# Patient Record
Sex: MALE | Race: WHITE | Employment: FULL TIME | RURAL
[De-identification: names, ages, dates, MRNs, and addresses within clinical notes are randomized per-mention and may not be internally consistent; named-entity substitution may affect disease eponyms.]

---

## 2021-01-16 ENCOUNTER — APPOINTMENT (OUTPATIENT)
Dept: CT IMAGING | Facility: HOSPITAL | Age: 31
End: 2021-01-16

## 2021-01-16 ENCOUNTER — HOSPITAL ENCOUNTER (EMERGENCY)
Facility: HOSPITAL | Age: 31
Discharge: HOME | End: 2021-01-16
Attending: EMERGENCY MEDICINE

## 2021-01-16 ENCOUNTER — APPOINTMENT (OUTPATIENT)
Dept: GENERAL RADIOLOGY | Facility: HOSPITAL | Age: 31
End: 2021-01-16

## 2021-01-16 VITALS
OXYGEN SATURATION: 98 % | SYSTOLIC BLOOD PRESSURE: 136 MMHG | WEIGHT: 145 LBS | HEART RATE: 68 BPM | BODY MASS INDEX: 21.98 KG/M2 | HEIGHT: 68 IN | TEMPERATURE: 98.5 F | RESPIRATION RATE: 18 BRPM | DIASTOLIC BLOOD PRESSURE: 79 MMHG
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
## 2021-01-16 DIAGNOSIS — F41.9 ANXIETY TENSION STATE: ICD-10-CM

## 2021-01-16 DIAGNOSIS — R10.9 ABDOMINAL PAIN, UNSPECIFIED ABDOMINAL LOCATION: ICD-10-CM

## 2021-01-16 DIAGNOSIS — M54.41 ACUTE BILATERAL LOW BACK PAIN WITH RIGHT-SIDED SCIATICA: ICD-10-CM

## 2021-01-16 LAB
A/G RATIO: 1.5 (ref 0.8–2)
ALBUMIN SERPL-MCNC: 4.4 G/DL (ref 3.4–4.8)
ALP BLD-CCNC: 77 U/L (ref 25–100)
ALT SERPL-CCNC: 21 U/L (ref 4–36)
AMPHETAMINE SCREEN, URINE: ABNORMAL
ANION GAP SERPL CALCULATED.3IONS-SCNC: 14 MMOL/L (ref 3–16)
AST SERPL-CCNC: 31 U/L (ref 8–33)
BARBITURATE SCREEN URINE: ABNORMAL
BASOPHILS ABSOLUTE: 0 K/UL (ref 0–0.1)
BASOPHILS RELATIVE PERCENT: 0.4 %
BENZODIAZEPINE SCREEN, URINE: ABNORMAL
BILIRUB SERPL-MCNC: 0.4 MG/DL (ref 0.3–1.2)
BILIRUBIN URINE: NEGATIVE
BLOOD, URINE: NEGATIVE
BUN BLDV-MCNC: 8 MG/DL (ref 6–20)
CALCIUM SERPL-MCNC: 9.7 MG/DL (ref 8.5–10.5)
CANNABINOID SCREEN URINE: POSITIVE
CHLORIDE BLD-SCNC: 97 MMOL/L (ref 98–107)
CLARITY: CLEAR
CO2: 23 MMOL/L (ref 20–30)
COCAINE METABOLITE SCREEN URINE: ABNORMAL
COLOR: YELLOW
CREAT SERPL-MCNC: 1 MG/DL (ref 0.4–1.2)
EOSINOPHILS ABSOLUTE: 0.1 K/UL (ref 0–0.4)
EOSINOPHILS RELATIVE PERCENT: 1.1 %
ETHANOL: NORMAL MG/DL (ref 0–0.08)
GFR AFRICAN AMERICAN: >59
GFR NON-AFRICAN AMERICAN: >59
GLOBULIN: 3 G/DL
GLUCOSE BLD-MCNC: 149 MG/DL (ref 74–106)
GLUCOSE URINE: NEGATIVE MG/DL
HCT VFR BLD CALC: 39.4 % (ref 40–54)
HEMOGLOBIN: 13.7 G/DL (ref 13–18)
IMMATURE GRANULOCYTES #: 0 K/UL
IMMATURE GRANULOCYTES %: 0.2 % (ref 0–5)
KETONES, URINE: NEGATIVE MG/DL
LACTIC ACID: 1.8 MMOL/L (ref 0.4–2)
LEUKOCYTE ESTERASE, URINE: NEGATIVE
LYMPHOCYTES ABSOLUTE: 2.6 K/UL (ref 1.5–4)
LYMPHOCYTES RELATIVE PERCENT: 26.7 %
Lab: ABNORMAL
MCH RBC QN AUTO: 33.5 PG (ref 27–32)
MCHC RBC AUTO-ENTMCNC: 34.8 G/DL (ref 31–35)
MCV RBC AUTO: 96.3 FL (ref 80–100)
METHADONE SCREEN, URINE: ABNORMAL
METHAMPHETAMINE, URINE: ABNORMAL
MICROSCOPIC EXAMINATION: NORMAL
MONOCYTES ABSOLUTE: 0.9 K/UL (ref 0.2–0.8)
MONOCYTES RELATIVE PERCENT: 9.6 %
NEUTROPHILS ABSOLUTE: 5.9 K/UL (ref 2–7.5)
NEUTROPHILS RELATIVE PERCENT: 62 %
NITRITE, URINE: NEGATIVE
OPIATE SCREEN URINE: ABNORMAL
PDW BLD-RTO: 12 % (ref 11–16)
PH UA: 6 (ref 5–8)
PHENCYCLIDINE SCREEN URINE: ABNORMAL
PLATELET # BLD: 223 K/UL (ref 150–400)
PMV BLD AUTO: 9.5 FL (ref 6–10)
POTASSIUM SERPL-SCNC: 3.4 MMOL/L (ref 3.4–5.1)
PRO-BNP: 26 PG/ML (ref 0–900)
PROPOXYPHENE SCREEN, URINE: ABNORMAL
PROTEIN UA: NEGATIVE MG/DL
RBC # BLD: 4.09 M/UL (ref 4.5–6)
SODIUM BLD-SCNC: 134 MMOL/L (ref 136–145)
SPECIFIC GRAVITY UA: 1.01 (ref 1–1.03)
TOTAL PROTEIN: 7.4 G/DL (ref 6.4–8.3)
TRICYCLIC, URINE: ABNORMAL
TROPONIN: <0.3 NG/ML
UR OXYCODONE RAPID SCREEN: ABNORMAL
URINE REFLEX TO CULTURE: NORMAL
URINE TYPE: NORMAL
UROBILINOGEN, URINE: 0.2 E.U./DL
WBC # BLD: 9.6 K/UL (ref 4–11)

## 2021-01-16 PROCEDURE — 80307 DRUG TEST PRSMV CHEM ANLYZR: CPT

## 2021-01-16 PROCEDURE — 36415 COLL VENOUS BLD VENIPUNCTURE: CPT

## 2021-01-16 PROCEDURE — 71045 X-RAY EXAM CHEST 1 VIEW: CPT

## 2021-01-16 PROCEDURE — 93005 ELECTROCARDIOGRAM TRACING: CPT

## 2021-01-16 PROCEDURE — 70450 CT HEAD/BRAIN W/O DYE: CPT

## 2021-01-16 PROCEDURE — 83605 ASSAY OF LACTIC ACID: CPT

## 2021-01-16 PROCEDURE — 81003 URINALYSIS AUTO W/O SCOPE: CPT

## 2021-01-16 PROCEDURE — 72131 CT LUMBAR SPINE W/O DYE: CPT

## 2021-01-16 PROCEDURE — 6360000004 HC RX CONTRAST MEDICATION: Performed by: EMERGENCY MEDICINE

## 2021-01-16 PROCEDURE — G0480 DRUG TEST DEF 1-7 CLASSES: HCPCS

## 2021-01-16 PROCEDURE — 85025 COMPLETE CBC W/AUTO DIFF WBC: CPT

## 2021-01-16 PROCEDURE — 2580000003 HC RX 258: Performed by: EMERGENCY MEDICINE

## 2021-01-16 PROCEDURE — 99283 EMERGENCY DEPT VISIT LOW MDM: CPT

## 2021-01-16 PROCEDURE — 80053 COMPREHEN METABOLIC PANEL: CPT

## 2021-01-16 PROCEDURE — 83880 ASSAY OF NATRIURETIC PEPTIDE: CPT

## 2021-01-16 PROCEDURE — 84484 ASSAY OF TROPONIN QUANT: CPT

## 2021-01-16 PROCEDURE — 74177 CT ABD & PELVIS W/CONTRAST: CPT

## 2021-01-16 PROCEDURE — 87040 BLOOD CULTURE FOR BACTERIA: CPT

## 2021-01-16 RX ORDER — 0.9 % SODIUM CHLORIDE 0.9 %
1000 INTRAVENOUS SOLUTION INTRAVENOUS ONCE
Status: COMPLETED | OUTPATIENT
Start: 2021-01-16 | End: 2021-01-16

## 2021-01-16 RX ADMIN — SODIUM CHLORIDE 1000 ML: 9 INJECTION, SOLUTION INTRAVENOUS at 06:54

## 2021-01-16 RX ADMIN — IOPAMIDOL 100 ML: 755 INJECTION, SOLUTION INTRAVENOUS at 07:14

## 2021-01-16 ASSESSMENT — PAIN DESCRIPTION - LOCATION: LOCATION: BACK;LEG;HEAD

## 2021-01-16 ASSESSMENT — PAIN DESCRIPTION - DESCRIPTORS: DESCRIPTORS: SHOOTING

## 2021-01-16 ASSESSMENT — PAIN DESCRIPTION - ORIENTATION: ORIENTATION: LOWER;RIGHT;LEFT

## 2021-01-16 ASSESSMENT — PAIN DESCRIPTION - PROGRESSION: CLINICAL_PROGRESSION: GRADUALLY IMPROVING

## 2021-01-16 NOTE — ED NOTES
Administered Isovue 370 per radiology protocol via patent hep lock established in ER. Pt tolerated the exam with no apparent difficulties and was d/c'd back to ER with no apparent reaction to contrast.  ER RN notified of contrast administration.

## 2021-01-16 NOTE — ED PROVIDER NOTES
Emergency Department Encounter  Location: [address] [place] [address] [address]    Patient: Km Sanon  MRN: [id number]  : 1990  Date of evaluation: 2021  ED Provider: Noelle Weaver DO    0700  Km Sanon was checked out to me by Dr. Mami Salgado. Please see his/her initial documentation for details of the patient's initial ED presentation, physical exam and completed studies. In brief, Km Sanon is a 27 y.o. male that presented to the emergency department for fall 3 days ago with injury to back. Pain radiates down right leg. No loss of bowel or bladder control. Patient states he feels like he is having spasms in his pelvic area. No head injury or LOC but states that he has had a headache since the fall. Patient thinks that he may be having seizures but no LOC or loss of time. C/o of abdominal \"twitching\". Patient had some discomfort this morning that started shooting all the way up to his \"brain and then down to his toes\". Patient admits he has been drinking alcohol and uses marijuana. Symptoms present for the past 3 days.     I have reviewed and interpreted all of the currently available lab results and diagnostics from this visit:  Results for orders placed or performed during the hospital encounter of 21   Brain Natriuretic Peptide   Result Value Ref Range    Pro-BNP 26 0 - 900 pg/mL   CBC Auto Differential   Result Value Ref Range    WBC 9.6 4.0 - 11.0 K/uL    RBC 4.09 (L) 4.50 - 6.00 M/uL    Hemoglobin 13.7 13.0 - 18.0 g/dL    Hematocrit 39.4 (L) 40.0 - 54.0 %    MCV 96.3 80.0 - 100.0 fL    MCH 33.5 (H) 27.0 - 32.0 pg    MCHC 34.8 31.0 - 35.0 g/dL    RDW 12.0 11.0 - 16.0 %    Platelets 744 435 - 254 K/uL    MPV 9.5 6.0 - 10.0 fL    Neutrophils % 62.0 %    Immature Granulocytes % 0.2 0.0 - 5.0 %    Lymphocytes % 26.7 %    Monocytes % 9.6 %    Eosinophils % 1.1 %    Basophils % 0.4 %    Neutrophils Absolute 5.9 2.0 - 7.5 K/uL    Immature Granulocytes # 0.0 K/uL Lymphocytes Absolute 2.6 1.5 - 4.0 K/uL    Monocytes Absolute 0.9 (H) 0.2 - 0.8 K/uL    Eosinophils Absolute 0.1 0.0 - 0.4 K/uL    Basophils Absolute 0.0 0.0 - 0.1 K/uL   Comprehensive Metabolic Panel   Result Value Ref Range    Sodium 134 (L) 136 - 145 mmol/L    Potassium 3.4 3.4 - 5.1 mmol/L    Chloride 97 (L) 98 - 107 mmol/L    CO2 23 20 - 30 mmol/L    Anion Gap 14 3 - 16    Glucose 149 (H) 74 - 106 mg/dL    BUN 8 6 - 20 mg/dL    CREATININE 1.0 0.4 - 1.2 mg/dL    GFR Non-African American >59 >59    GFR African American >59 >59    Calcium 9.7 8.5 - 10.5 mg/dL    Total Protein 7.4 6.4 - 8.3 g/dL    Alb 4.4 3.4 - 4.8 g/dL    Albumin/Globulin Ratio 1.5 0.8 - 2.0    Total Bilirubin 0.4 0.3 - 1.2 mg/dL    Alkaline Phosphatase 77 25 - 100 U/L    ALT 21 4 - 36 U/L    AST 31 8 - 33 U/L    Globulin 3.0 g/dL   Drug Screen, Multiple, Urine   Result Value Ref Range    PCP Screen, Urine Neg Negative <25 ng/mL    Benzodiazepine Screen, Urine Neg Negative <300 ng/mL    Cocaine Metabolite Screen, Urine Neg Negative <300 ng/mL    Amphetamine Screen, Urine Neg Negative <1000 ng/mL    Cannabinoid Scrn, Ur POSITIVE (A) Negative <50 ng/mL    Opiate Scrn, Ur Neg Negative <300 ng/mL    Barbiturate Screen, Ur Neg Negative <980 ng/mL    Tricyclic Neg Negative <657 ng/mL    Methadone Screen, Urine Neg Negative <300 ng/ml    Propoxyphene Screen, Urine Neg Negative <300 ng/mL    Methamphetamine, Urine Neg Negative <1000 ng/mL    UR Oxycodone Rapid Screen Neg Negative <100 ng/mL    Drug Screen Comment: see below    Lactic Acid, Plasma   Result Value Ref Range    Lactic Acid 1.8 0.4 - 2.0 mmol/L   Troponin   Result Value Ref Range    Troponin <0.30 <0.30 ng/mL   Urinalysis Reflex to Culture    Specimen: Urine, clean catch   Result Value Ref Range    Color, UA Yellow Straw/Yellow    Clarity, UA Clear Clear    Glucose, Ur Negative Negative mg/dL    Bilirubin Urine Negative Negative    Ketones, Urine Negative Negative mg/dL    Specific Gravity, UA 1.010 1.005 - 1.030    Blood, Urine Negative Negative    pH, UA 6.0 5.0 - 8.0    Protein, UA Negative Negative mg/dL    Urobilinogen, Urine 0.2 <2.0 E.U./dL    Nitrite, Urine Negative Negative    Leukocyte Esterase, Urine Negative Negative    Microscopic Examination Not Indicated     Urine Type Voided     Urine Reflex to Culture Not Indicated    Ethanol   Result Value Ref Range    Ethanol Lvl None Detected mg/dL     Ct Head Wo Contrast    Result Date: 1/16/2021  PROCEDURE: CT head without contrast INDICATION: Fall with questionable seizure activity; COMPARISON: None. TECHNIQUE: Axial CT imaging obtained from vertex to skull base. Axial images and multiplanar reformatted images reviewed. Up-to-date CT equipment and radiation dose reduction techniques were employed. IV Contrast: None. FINDINGS: No acute intra- or extra-axial fluid collections are identified. The ventricles are of normal size, shape, and morphology. The basilar cisterns are patent. No mass effect or midline shift is seen. The gray-white matter differentiation is normal. No cerebral density abnormality is seen. Visualized portions of the orbits, paranasal sinuses, and mastoids appear normal. No acute fracture is identified. No evidence of acute intracranial abnormality. Ct Lumbar Spine Wo Contrast    Result Date: 1/16/2021  EXAM: CT LUMBAR SPINE WO CONTRAST INDICATION: BACK INJURY, lumbar pain, Fall and hit back; now with pain radiating down the left leg COMPARISON: None. TECHNIQUE: Axial CT imaging obtained through the lumbar spine. Axial images and multiplanar reformatted images reviewed. Individualized dose optimization technique was used in order to meet ALARA standards for radiation dose reduction.   In addition to vendor specific dose reduction algorithms, the dose reduction techniques vary based on the specific scanner utilized but frequently include automated exposure control, adjustment of the mA and/or kV according to patient size, and use of iterative reconstruction technique. IV contrast: None. FINDINGS: ALIGNMENT: Normal. BONES: No fracture or destructive osseous process. PARASPINAL SOFT TISSUES: Unremarkable. RETROPERITONEUM: No mass lesion. L1-L2: No significant spinal or foraminal stenosis. L2-L3: No significant spinal or foraminal stenosis. L3-L4: No significant spinal or foraminal stenosis. L4-L5: No significant spinal or foraminal stenosis. L5-S1: No significant spinal or foraminal stenosis. No evidence of lumbar spine trauma. Ct Abdomen Pelvis W Iv Contrast Additional Contrast? None    Result Date: 1/16/2021  EXAM: CT ABDOMEN AND PELVIS WITH CONTRAST INDICATION: abdominal pain COMPARISON: None. TECHNIQUE: Axial CT imaging obtained from lung bases through pelvis. Axial images and multiplanar reformatted images are provided for review. Individualized dose optimization technique was used in order to meet ALARA standards for radiation dose reduction. In addition to vendor specific dose reduction algorithms, the dose reduction techniques vary based on the specific scanner utilized but frequently include automated exposure control, adjustment of the mA and/or kV according to patient size, and use of iterative reconstruction technique. IV Contrast: 100 mL Isovue-370 FINDINGS: LUNG BASES: Unremarkable. LIVER: Hypoattenuating lesion in the hepatic dome which is incompletely characterized but likely benign. GALLBLADDER AND BILIARY TREE: No calcified gallstones. No gallbladder distention. No intra- or extrahepatic biliary dilatation. PANCREAS: Unremarkable. SPLEEN: Splenule. ADRENAL GLANDS: Unremarkable. KIDNEYS AND URETERS: No hydronephrosis. Normal enhancement. No urolithiasis. URINARY BLADDER: Unremarkable. REPRODUCTIVE ORGANS: Unremarkable. BOWEL: Normal caliber. Normal appendix. LYMPH NODES: No abnormally enlarged nodes. PERITONEUM/RETROPERITONEUM: No ascites or free air.  VESSELS: Aorta is normal caliber and proximal branch vessels are patent. ABDOMINAL WALL: Unremarkable. BONES: No acute abnormality. No acute intra-abdominopelvic abnormality. Xr Chest Portable    Result Date: 1/16/2021  Patient: Reji Mckeon  Time Out: 06:54 Exam(s): FILM CXR 1 VIEW  EXAM:   XR Chest, 1 View  CLINICAL HISTORY:   Altered mental status. TECHNIQUE:   Frontal view of the chest.  COMPARISON:   No relevant prior studies available. FINDINGS:   Lungs:  Unremarkable. No acute infiltration, atelectasis or mass. Pleural space:  Unremarkable. No pneumothorax or pleural fluid. Heart:  Unremarkable. No cardiomegaly. Mediastinum:  Unremarkable. Bones/joints:  No acute findings. Electronically signed by Ida Mendoza MD on 01-16-21 at 2015      Negative chest x-ray. Final ED Course and MDM: In brief, Deepika Mar is a 27 y.o. male whose care was signed out to me by the outgoing provider. In brief, patient here for multiple complaints which he thinks may have started 3 days ago status post fall. Patient had no loss of consciousness or head injury from that incident. No loss of bowel or bladder control. He does complain of some sciatica type symptoms on the right. Initial examination by the initial physician did not show any neurological deficit. Patient was checked out at shift change awaiting laboratory and radiological results. Final disposition be determined once his work-up is being complete. Patient coming back from the radiology advised that that he could provide urine sample. He actually got up off the stretcher himself and ambulated to the bathroom without any difficulty which was witnessed by me and radiology tech. Patient advised that the symptoms that he was having he just felt like he could have possibly having increased anxiety but wasn't sure.  Patient didn't know if he was having a seizure or a TIA per conversation but advised symptoms not consistent with either TIA or seizure activity. Blood work showed white count 9600, hemoglobin 13.7, hematocrit 39.4, platelet counts 627. Comprehensive metabolic panel was benign except for sodium 134, glucose of 149. Troponin was nondetectable. Ethanol was nondetected. proBNP is normal at 26. Lactic acid was normal at 1.8. UA was negative no culture or microscopy indicated. Urine drug screen positive for cannabinoid but the patient was upfront admitted that he does smoke this. Portable chest radiograph read by radiology as negative chest radiograph    CT scan of the head without contrast read by radiology as no evidence of acute intracranial abnormality. CT of the abdomen pelvis with IV contrast read by radiology as no acute intra abdominal pelvic abnormality. CT lumbar spine without contrast read by radiology as no evidence of lumbar spine trauma. Patient's radiological diagnostic studies were discussed with him and he does state his understanding. Patient was offered something for nausea but declined. Advised that we could write him a prescription for Motrin but states he already has ibuprofen at home. Patient was asking something for anxiety other than over-the-counter advised that we normally do not prescribe that type of medication from the emergency department especially SSRIs which have to be titrated and then we tend not to write for a fast acting medication because of the addictive nature of this however Benadryl over-the-counter would be able to be used if he felt like he needed something to use at home. Patient was offered the no PCP referral order which she was agreeable to. Patient advised that someone from the hospital contact him in the next 3-5 business days to help set up primary care provider through the hospital system that is local to him. Patient declined work excuse. Otherwise patient be discharged home in stable condition.   Patient was given instructions that if symptoms worsens or new symptoms arise he should return back to emergency department for further evaluation work-up. ED Medication Orders (From admission, onward)    Start Ordered     Status Ordering Provider    01/16/21 2191 01/16/21 0646  iopamidol (ISOVUE-370) 76 % injection 100 mL  IMG ONCE PRN      Last MAR action: Given - by John Hernandez on 01/16/21 at [address] [facility]    41/11/78 4425 01/16/21 0608  0.9 % sodium chloride bolus  ONCE      Last MAR action: New Bag - by Lawson Bishop on 01/16/21 at [facility]          Final Impression      1. Fall, initial encounter    2. Acute bilateral low back pain with right-sided sciatica    3. Abdominal pain, unspecified abdominal location    4.  Anxiety tension state        DISPOSITION       (Please note that portions of this note may have been completed with a voice recognition program. Efforts were made to edit the dictations but occasionally words are mis-transcribed.)    Christel Mullen, [address], DO  01/16/21 [address], DO  01/16/21 9644

## 2021-01-16 NOTE — ED TRIAGE NOTES
Pt C/O tremors bilateral legs, mainly on the left side starting at the stomach going around to the back down the left leg, but can go to right leg at times, then tension in muscles and numbness goes up left flank area, some CP, down left arm, up left side of neck  Around to the back of the head. Confusion follows. Pt does talk slower and harder to find words. It feels like it is hard to breathe at times and could passout if he doesn't concentrate. Pt has not had any episodes since he has laid down flat. He had a few back to back when he first came in. Pt is alert and oriented, all V/S WNL.

## 2021-01-16 NOTE — ED TRIAGE NOTES
Pt stated that he fell three days ago on a trash bag. Denied LOC, or hitting his head. Episodes started after he fell. But he did have the headaches before. He also c/o pressure in left ear as well. Pt had been in [place] a few weeks ago. Stated he has drank heavily since then and stopped about 3 days ago. He has drank a beer a day since stopping binge. Pt states he smokes pot nightly for sleep.

## 2021-01-16 NOTE — ED PROVIDER NOTES
[address] [place] [address] Court  eMERGENCY dEPARTMENT eNCOUnter      Pt Name: Sunday Francis  MRN: [id number]  [place]: 1990  Date ofevaluation: 4/01/5997  Provider: Kristin Snow MD    [address] [place] [address]       Chief Complaint   Patient presents with   [place] Fall    Back Pain    Tremors         HISTORY OF PRESENT ILLNESS  (Location/Symptom, Timing/Onset, Context/Setting, Quality, Duration, Modifying Factors, Severity.)   Sunday Francis is a 27 y.o. male who presents to the emergency department with multiple complaints. Fall--Patient reports falling when he attempted to step over a trash bag and fell, landing on his back 3 days ago. He denies hitting his head or losing consciousness. He says that he hit his spine when he fell. He has bilateral low back pain that radiates into his left leg. No perineal numbness. No urinary or bowel incontinence or retention. Patient thinks he is getting spasms in his pelvic area. Stomach pain and twitching--Then, he developed stomach \"gurgling\" and abdominal pain at 0400 this morning. He drank some water and laid down. He then started having left sided shooting pains and weird sensations on his left side. He then felt like the sensations went to his brain and he got confused and thought he was having a seizure. (He doesn't described an actual seizure.)  He felt like he was going to \"pass out\". He says that these symptoms are worse with being upright or standing. \"Uncontrollable shaking\"--He keeps saying every time he give me parts of the history his body \"gets activated\" and starts shaking. He is grabbing at his stomach and seems to be very jittery. He denies drug use. Chest pain--He reports some chest pain with all of the above. He admits to drinking 12 days in a row and started weaning off the alcohol 3 days ago. He drinks daily but only 1 beer to 3 beers/day regularly. He gets intoxicated occasionally but not often. He's unsure if his ETOH is related or not. No covid exposures. Nursing notes were reviewed. REVIEW OF SYSTEMS    (2-9systems for level 4, 10 or more for level 5)   ROS:  General:  No fevers, no chills, + weakness  HEENT: No sore throat, runny nose or ear pain  Cardiovascular:  + \"little bit\" chest pain, no palpitations  Respiratory:  + shortness of breath, + just a couple times had a cough, no wheezing  Gastrointestinal:  + pain, no nausea, no vomiting, no diarrhea  Musculoskeletal:  + back pain as above. No muscle pain, no joint pain  Skin:  No rash, no easy bruising  Genitourinary:  No dysuria, no hematuria  + headache    Except as noted above theremainder of the review of systems was reviewed and negative. PASTMEDICAL HISTORY     Past Medical History:   Diagnosis Date    Headache          SURGICAL HISTORY     History reviewed. No pertinent surgical history. CURRENT MEDICATIONS       Previous Medications    No medications on file       ALLERGIES     Patient has no known allergies. FAMILY HISTORY     History reviewed. No pertinent family history. SOCIAL HISTORY       Social History     Socioeconomic History    Marital status: Single     Spouse name: None    Number of children: None    Years of education: None    Highest education level: None   Occupational History    None   Social Needs    Financial resource strain: None    Food insecurity     Worry: None     Inability: None    Transportation needs     Medical: None     Non-medical: None   Tobacco Use    Smoking status: Never Smoker    Smokeless tobacco: Never Used   Substance and Sexual Activity    Alcohol use:  Yes     Alcohol/week: 2.0 standard drinks     Types: 2 Cans of beer per week     Comment: every day    Drug use: Yes     Frequency: 7.0 times per week     Types: Marijuana     Comment: nightly    Sexual activity: None   Lifestyle    Physical activity     Days per week: None     Minutes per session: None    Stress: None   Relationships    Social connections     Talks on phone: None     Gets together: None     Attends Rastafari service: None     Active member of club or organization: None     Attends meetings of clubs or organizations: None     Relationship status: None    Intimate partner violence     Fear of current or ex partner: None     Emotionally abused: None     Physically abused: None     Forced sexual activity: None   Other Topics Concern    None   Social History Narrative    None         PHYSICAL EXAM    (up to 7 forlevel 4, 8 or more for level 5)     ED Triage Vitals   BP Temp Temp src Pulse Resp SpO2 Height Weight   -- -- -- -- -- -- -- --       Physical Exam  General :Patient is awake, alert, oriented, pressured speech; shaking and being very dramatic; in no acute distress, nontoxic appearing  HEENT: Pupils are equally round and reactive to light, EOMI. Cardiac: Heart regular rate, rhythm, no murmurs, rubs, or gallops  Lungs: Lungs are clear to auscultation, there is no wheezing, rhonchi, or rales. Abdomen:Abdomen is soft, nontender, nondistended. Musculoskeletal: Ambulatory  Back: No midline or bony tenderness. Dermatology: Skin is warm and dry  Psych: Mentation is grossly normal, cognition is grossly normal. Affect is appropriate. DIAGNOSTIC RESULTS     EKG:  NSR  Rate of 75  Normal EKG    RADIOLOGY:   Non-plain film images such as CT, Ultrasoundand MRI are read by the radiologist. Plain radiographic images are visualized and preliminarily interpreted by the emergency physician with the below findings:      [x] Radiologist's Report Reviewed:  XR CHEST PORTABLE   Final Result     Negative chest x-ray.           CT HEAD WO CONTRAST    (Results Pending)   CT LUMBAR SPINE WO CONTRAST    (Results Pending)   CT ABDOMEN PELVIS W IV CONTRAST Additional Contrast? None    (Results Pending)         ED BEDSIDE ULTRASOUND:   Performed by ED Physician - none    LABS:  Labs Reviewed   CBC WITH AUTO DIFFERENTIAL - Abnormal; Notable for the following components:       Result Value    RBC 4.09 (*)     Hematocrit 39.4 (*)     MCH 33.5 (*)     Monocytes Absolute 0.9 (*)     All other components within normal limits    Narrative:     Performed at:  [address] [place] [address] Laboratory  [address] [place], [place] [address], [place] [address]   Phone [phone]   COMPREHENSIVE METABOLIC PANEL - Abnormal; Notable for the following components:    Sodium 134 (*)     Chloride 97 (*)     Glucose 149 (*)     All other components within normal limits    Narrative:     Performed at:  [address] [place] [address] Laboratory  [address] [place] [address]  [place], [place] [address]   Phone [phone]   CULTURE, BLOOD 1   BRAIN NATRIURETIC PEPTIDE    Narrative:     Performed at:  [address] [place] [address] Laboratory  [address] [place], [place] [address], [place] [address]   Phone [phone]   LACTIC ACID, PLASMA    Narrative:     Performed at:  [address] [place] [address] Laboratory  [address] [place], [place] [address], [place] [address]   Phone [phone]   TROPONIN    Narrative:     Performed at:  [address] [place] [address] Laboratory  [address] [place], [place] [address], [place] [address]   Phone [phone]   ETHANOL    Narrative:     Performed at:  [address] [place] [address] Laboratory  [address] [place], [place] [address], [place] [address]   Phone [phone]   DRUG SCREEN MULTI URINE   URINE RT REFLEX TO CULTURE       I have reviewed and interpreted all of the currently available lab resultsfrom this visit (if applicable):  Results for orders placed or performed during the hospital encounter of 01/16/21   Brain Natriuretic Peptide   Result Value Ref Range    Pro-BNP 26 0 - 900 pg/mL   CBC Auto Differential   Result Value Ref Range    WBC 9.6 4.0 - 11.0 K/uL    RBC 4.09 (L) 4.50 - 6.00 M/uL    Hemoglobin 13.7 13.0 - 18.0 g/dL    Hematocrit 39.4 (L) 40.0 - 54.0 %    MCV 96.3 80.0 - 100.0 fL    MCH 33.5 (H) 27.0 - 32.0 pg    MCHC 34.8 31.0 - 35.0 g/dL    RDW 12.0 11.0 - 16.0 %    Platelets 334 123 - 745 K/uL    MPV 9.5 6.0 - 10.0 fL    Neutrophils % 62.0 %    Immature Granulocytes % 0.2 0.0 - 5.0 %    Lymphocytes % 26.7 %    Monocytes % 9.6 %    Eosinophils % 1.1 %    Basophils % 0.4 %    Neutrophils Absolute 5.9 2.0 - 7.5 K/uL    Immature Granulocytes # 0.0 K/uL    Lymphocytes Absolute 2.6 1.5 - 4.0 K/uL    Monocytes Absolute 0.9 (H) 0.2 - 0.8 K/uL    Eosinophils Absolute 0.1 0.0 - 0.4 K/uL    Basophils Absolute 0.0 0.0 - 0.1 K/uL   Comprehensive Metabolic Panel   Result Value Ref Range    Sodium 134 (L) 136 - 145 mmol/L    Potassium 3.4 3.4 - 5.1 mmol/L    Chloride 97 (L) 98 - 107 mmol/L    CO2 23 20 - 30 mmol/L    Anion Gap 14 3 - 16    Glucose 149 (H) 74 - 106 mg/dL    BUN 8 6 - 20 mg/dL    CREATININE 1.0 0.4 - 1.2 mg/dL    GFR Non-African American >59 >59    GFR African American >59 >59    Calcium 9.7 8.5 - 10.5 mg/dL    Total Protein 7.4 6.4 - 8.3 g/dL    Alb 4.4 3.4 - 4.8 g/dL    Albumin/Globulin Ratio 1.5 0.8 - 2.0    Total Bilirubin 0.4 0.3 - 1.2 mg/dL    Alkaline Phosphatase 77 25 - 100 U/L    ALT 21 4 - 36 U/L    AST 31 8 - 33 U/L    Globulin 3.0 g/dL   Lactic Acid, Plasma   Result Value Ref Range    Lactic Acid 1.8 0.4 - 2.0 mmol/L   Troponin   Result Value Ref Range    Troponin <0.30 <0.30 ng/mL   Ethanol   Result Value Ref Range    Ethanol Lvl None Detected mg/dL        All other labs were within normal range or not returned as of this dictation. EMERGENCY DEPARTMENT COURSE and DIFFERENTIAL DIAGNOSIS/MDM:   Vitals:    Vitals:    01/16/21 0609 01/16/21 0610   BP:  136/79   Pulse: 68    Resp:  18   Temp: 98.5 °F (36.9 °C)    TempSrc: Oral    SpO2:  98%   Weight:  145 lb (65.8 kg)   Height:  5' 8\" (1.727 m)       0700  I have signed out [facility] Emergency Department care to Dr. Ramiro Blue.  We discussed the pertinent history, physical exam, completed/pending test results (if applicable) and current treatment plan. Please refer to his/her chart for the patients remaining Emergency Department course and final disposition. The patient will follow-up with their PCP in 1-2 days for reevaluation. If the patient or family members have any further concerns or any worsening symptoms they will return to the ED for reevaluation. CONSULTS:  None    PROCEDURES:  Procedures    CRITICAL CARE TIME    Total Critical Care time was 0 minutes, excluding separately reportable procedures. There was a high probability of clinically significant/life threatening deterioration in the patient's condition which required my urgent intervention. FINAL IMPRESSION    No diagnosis found. DISPOSITION/PLAN   DISPOSITION        PATIENT REFERRED TO:  No follow-up provider specified. DISCHARGE MEDICATIONS:  New Prescriptions    No medications on file       Comment: Please note this report has been produced using speech recognition software and may contain errors related tothat system including errors in grammar, punctuation, and spelling, as well as words and phrases that may be inappropriate. If there are any questions or concerns please feel free to contact the dictating provider forclarification.     Tiny Strange MD  Attending Emergency Physician                  Tiny Strange MD  01/16/21 7764

## 2021-01-20 LAB — BLOOD CULTURE, ROUTINE: NORMAL

## 2021-02-23 ENCOUNTER — OFFICE VISIT (OUTPATIENT)
Dept: FAMILY MEDICINE CLINIC | Age: 31
End: 2021-02-23

## 2021-02-23 DIAGNOSIS — F41.9 ANXIETY: ICD-10-CM

## 2021-02-23 DIAGNOSIS — R51.9 LEFT-SIDED HEADACHE: ICD-10-CM

## 2021-02-23 DIAGNOSIS — F32.A DEPRESSION, UNSPECIFIED DEPRESSION TYPE: Primary | ICD-10-CM

## 2021-02-23 DIAGNOSIS — R53.83 OTHER FATIGUE: ICD-10-CM

## 2021-02-23 PROCEDURE — 99203 OFFICE O/P NEW LOW 30 MIN: CPT | Performed by: NURSE PRACTITIONER

## 2021-02-23 RX ORDER — ASPIRIN 81 MG/1
81 TABLET ORAL PRN
COMMUNITY

## 2021-02-23 RX ORDER — IBUPROFEN 200 MG
200 TABLET ORAL PRN
COMMUNITY
End: 2022-10-05

## 2021-02-23 RX ORDER — SERTRALINE HYDROCHLORIDE 25 MG/1
25 TABLET, FILM COATED ORAL DAILY
Qty: 30 TABLET | Refills: 3 | Status: SHIPPED | OUTPATIENT
Start: 2021-02-23 | End: 2022-09-21

## 2021-02-23 ASSESSMENT — PATIENT HEALTH QUESTIONNAIRE - PHQ9
9. THOUGHTS THAT YOU WOULD BE BETTER OFF DEAD, OR OF HURTING YOURSELF: 1
SUM OF ALL RESPONSES TO PHQ QUESTIONS 1-9: 17
6. FEELING BAD ABOUT YOURSELF - OR THAT YOU ARE A FAILURE OR HAVE LET YOURSELF OR YOUR FAMILY DOWN: 3
4. FEELING TIRED OR HAVING LITTLE ENERGY: 1
10. IF YOU CHECKED OFF ANY PROBLEMS, HOW DIFFICULT HAVE THESE PROBLEMS MADE IT FOR YOU TO DO YOUR WORK, TAKE CARE OF THINGS AT HOME, OR GET ALONG WITH OTHER PEOPLE: 1
2. FEELING DOWN, DEPRESSED OR HOPELESS: 3
SUM OF ALL RESPONSES TO PHQ9 QUESTIONS 1 & 2: 6
SUM OF ALL RESPONSES TO PHQ QUESTIONS 1-9: 18
8. MOVING OR SPEAKING SO SLOWLY THAT OTHER PEOPLE COULD HAVE NOTICED. OR THE OPPOSITE, BEING SO FIGETY OR RESTLESS THAT YOU HAVE BEEN MOVING AROUND A LOT MORE THAN USUAL: 3
7. TROUBLE CONCENTRATING ON THINGS, SUCH AS READING THE NEWSPAPER OR WATCHING TELEVISION: 0

## 2021-02-23 ASSESSMENT — COLUMBIA-SUICIDE SEVERITY RATING SCALE - C-SSRS
6. HAVE YOU EVER DONE ANYTHING, STARTED TO DO ANYTHING, OR PREPARED TO DO ANYTHING TO END YOUR LIFE?: NO
3. HAVE YOU BEEN THINKING ABOUT HOW YOU MIGHT KILL YOURSELF?: NO

## 2021-02-23 NOTE — PROGRESS NOTES
left arm. Pt reports loss of cognition but decreased severity than previous episode. Last night on the flight home from [place] he developed left shoulder pain that shot up and down his body again. Pt reports it started as a stabbing pain left shoulder then develops as a crushing pain. Pain radiates down left let arm and left leg back up to his head and it feels hot. He also feels like he has a twitching sensation left groin during episodes. Pt has been having left posterior headaches over the past 7 years. Pt reports headaches are always there but are worse at times. Pt has not been evaluated for headaches. He felt they were due to stress/tension. During ER Visit on 01/18/2021, Pt had negative CT of head, Negative CT of lumbar spine, Negative abd CT and negative chest xray. Depression: Patient complains of depression. He complains of anhedonia, depressed mood, fatigue and insomnia. Onset was approximately several years. Worse since brother committed suicide 1 year ago and having a falling out with is sister. gradually worsening since that time. He denies current suicidal and homicidal plan or intent. Family history significant for anxiety, depression and bipolar. Possible organic causes contributing are: none. Risk factors: positive family history in  brother(s) and sister(s), negative life event loss of job when he had a falling out with sister and lost 6 figure income and previous episode of depression Previous treatment includes xanax and Doxepin. He complains of the following side effects from the treatment: none. Pt reports in 2018 he lost his job making a \"6 figure income\" and went to living with his father in law and having no income. He feels like since the above episodes he has got in his head and his anxiety is worsening and so is his depression. He is very active and exercises daily. Pt feels this helps manage symptoms.  He enjoys rock climbing but is upset because he can not rock climb until these symptoms are evaluated or resolve. Pt is feeling well today without acute complaints. Chief Complaint   Patient presents with   [facility] Establish Care    Shoulder Pain    Numbness        Review of Systems   Constitutional: Negative. Negative for activity change, appetite change, chills, diaphoresis, fatigue, fever and unexpected weight change. HENT: Negative. Respiratory: Negative for cough and shortness of breath. Cardiovascular: Negative for chest pain. Gastrointestinal: Negative for abdominal pain, diarrhea, nausea and vomiting. Endocrine: Negative for heat intolerance, polydipsia, polyphagia and polyuria. Musculoskeletal: Positive for arthralgias (intermittent). Skin: Negative. Neurological: Positive for numbness (intermittent). Psychiatric/Behavioral: Positive for confusion, decreased concentration, dysphoric mood and sleep disturbance (sleeps well if he drinks passion flower tincture and smokes Marijuana). Negative for hallucinations, self-injury and suicidal ideas. The patient is nervous/anxious. The patient is not hyperactive. OBJECTIVE:    There were no vitals taken for this visit. Physical Exam  Vitals signs and nursing note reviewed. Constitutional:       General: He is not in acute distress. Appearance: Normal appearance. He is well-developed and normal weight. He is not ill-appearing, toxic-appearing or diaphoretic. HENT:      Head: Normocephalic. Neck:      Thyroid: No thyromegaly. Vascular: No carotid bruit. Cardiovascular:      Rate and Rhythm: Normal rate and regular rhythm. Heart sounds: Normal heart sounds. No murmur. Pulmonary:      Effort: Pulmonary effort is normal.      Breath sounds: Normal breath sounds. Skin:     General: Skin is warm and dry. Neurological:      Mental Status: He is alert and oriented to person, place, and time. Cranial Nerves: Cranial nerves are intact.       Motor: Motor function is intact. Coordination: Coordination is intact. Gait: Gait is intact. Psychiatric:         Attention and Perception: Attention and perception normal.         Mood and Affect: Affect normal. Mood is anxious. Mood is not depressed. Speech: Speech normal.         Behavior: Behavior normal.         Thought Content: Thought content normal.         Judgment: Judgment normal.         ASSESSMENT/PLAN:   Sandra Parker was seen today for establish care, shoulder pain and numbness. Diagnoses and all orders for this visit:    Depression, unspecified depression type  -     TSH with Reflex; Future  -     sertraline (ZOLOFT) 25 MG tablet; Take 1 tablet by mouth daily  -     [facility]. Mckay Tran, [address] [facility]. Xiang Harvey, [facility]    Left-sided headache  -I have recommended MRI due to unilateral headaches and episodes of left sided numbness, decreased cognition and decreased ability to focus. Pt declined at this time due to being uninsured. Pt reports he is in contact with medicaid trying to get insurance and he will follow up as soon as he gets notice of approval.  Pt advised to go to the ER if he has additional symptoms. Other fatigue  - I have recommended a TSH, B12 & folate, CBC, CMP and screening cholesterol. Pt agrees to return for labs when insurance is approved. After patient left the office, I spent some time researching Passion flower. I found that passion flower tea may least cause altered consciousness, loss of coordination, confusion, dizziness, Drowsiness, increased HR, vasculitis, prolonged QT interval, and pancreas and liver toxicity. Safia Romero MA called and notified patient of these possible side effects and how this could be linked to his symptoms. I have advised patient to stop drinking Passion Flower Tincture. Pt verbalized understanding per Safia Romero MA.   I have also encourage patient to stop Marijuana use and ETOH use. Return in about 1 week (around 3/2/2021). Current Outpatient Medications on File Prior to Visit   Medication Sig Dispense Refill    ibuprofen (ADVIL;MOTRIN) 200 MG tablet Take 200 mg by mouth as needed       aspirin 81 MG EC tablet Take 81 mg by mouth as needed        No current facility-administered medications on file prior to visit.

## 2021-03-02 PROBLEM — F41.9 ANXIETY: Status: ACTIVE | Noted: 2021-03-02

## 2021-03-02 ASSESSMENT — ENCOUNTER SYMPTOMS
NAUSEA: 0
DIARRHEA: 0
ABDOMINAL PAIN: 0
VOMITING: 0
COUGH: 0
SHORTNESS OF BREATH: 0

## 2021-03-03 ENCOUNTER — TELEPHONE (OUTPATIENT)
Dept: BEHAVIORAL/MENTAL HEALTH | Age: 31
End: 2021-03-03

## 2021-03-03 NOTE — TELEPHONE ENCOUNTER
3/3/2021: [facility] attempted to follow up again to schedule, [facility] will continue attempts.

## 2021-03-03 NOTE — TELEPHONE ENCOUNTER
3/2/2021 12PM : Referral from PCP As per PCP Referral: \"Depression, unspecified depression type  - Primary; Anxiety   Reason for Referral? Resources and Coping Skills and Traditional Longer Term Counseling \" As perReferral \"Hx of anxiety and Depression previously treated with Doxepin & Xanax prior to the age of 25 when he stopped medications. He also had individual therapy prior to moving to [place]. He reports anhedonia, fatigue, insomnia, and depressed mood. + depression screening and answered yes to in the past month, wishing he could go to sleep and not wake up. Denies thoughts of suicide or self harm. \"  3/2/2021 4:45PM:  Ignacio Sarabia attempted to follow up with pt foloowing  referral from PCP. Ignacio Sarabia left contact information for Ignacio Sarabia. [facility] Sarabia will attempt back following day if no response.

## 2021-03-04 ENCOUNTER — VIRTUAL VISIT (OUTPATIENT)
Dept: BEHAVIORAL/MENTAL HEALTH | Age: 31
End: 2021-03-04

## 2021-03-04 DIAGNOSIS — F32.A DEPRESSION, UNSPECIFIED DEPRESSION TYPE: Primary | ICD-10-CM

## 2021-03-04 DIAGNOSIS — F41.9 ANXIETY DISORDER, UNSPECIFIED TYPE: ICD-10-CM
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
## 2021-03-09 ENCOUNTER — HOSPITAL ENCOUNTER (OUTPATIENT)
Facility: HOSPITAL | Age: 31
Discharge: HOME | End: 2021-03-09

## 2021-03-09 ENCOUNTER — OFFICE VISIT (OUTPATIENT)
Dept: FAMILY MEDICINE CLINIC | Age: 31
End: 2021-03-09

## 2021-03-09 VITALS
WEIGHT: 144.6 LBS | TEMPERATURE: 97.3 F | SYSTOLIC BLOOD PRESSURE: 135 MMHG | DIASTOLIC BLOOD PRESSURE: 77 MMHG | BODY MASS INDEX: 21.99 KG/M2 | HEART RATE: 71 BPM

## 2021-03-09 DIAGNOSIS — R10.9 ABDOMINAL PAIN, UNSPECIFIED ABDOMINAL LOCATION: ICD-10-CM

## 2021-03-09 DIAGNOSIS — R73.09 ELEVATED GLUCOSE: ICD-10-CM

## 2021-03-09 DIAGNOSIS — R73.09 ELEVATED GLUCOSE: Primary | ICD-10-CM

## 2021-03-09 DIAGNOSIS — F32.A DEPRESSION, UNSPECIFIED DEPRESSION TYPE: ICD-10-CM

## 2021-03-09 LAB
AMYLASE: 83 U/L (ref 20–104)
CHP ED QC CHECK: NORMAL
GLUCOSE BLD-MCNC: 86 MG/DL
HBA1C MFR BLD: 4.7 %
LIPASE: 31 U/L (ref 5.6–51.3)
T4 FREE: 1.03 NG/DL (ref 0.89–1.76)
TSH REFLEX: 7.14 UIU/ML (ref 0.27–4.2)

## 2021-03-09 PROCEDURE — 83036 HEMOGLOBIN GLYCOSYLATED A1C: CPT

## 2021-03-09 PROCEDURE — 84443 ASSAY THYROID STIM HORMONE: CPT

## 2021-03-09 PROCEDURE — 83690 ASSAY OF LIPASE: CPT

## 2021-03-09 PROCEDURE — 82962 GLUCOSE BLOOD TEST: CPT | Performed by: NURSE PRACTITIONER

## 2021-03-09 PROCEDURE — 82150 ASSAY OF AMYLASE: CPT

## 2021-03-09 PROCEDURE — 99213 OFFICE O/P EST LOW 20 MIN: CPT | Performed by: NURSE PRACTITIONER

## 2021-03-09 PROCEDURE — 84439 ASSAY OF FREE THYROXINE: CPT

## 2021-03-09 PROCEDURE — 36415 COLL VENOUS BLD VENIPUNCTURE: CPT

## 2021-03-09 NOTE — PROGRESS NOTES
SUBJECTIVE:    Elva Handley is a 27 y.o. male    Pt presents for evaluation of elevated glucose. Pt feels episodes of left sided pain and numbness may be due to elevated glucose. He was reviewing ER notes and notices his glucose was 149 during initial episode. Pt has stopped drinking Passion Flower tincture as directed. Since this time he has only had one episode of left sided pain/numbness. He reports he had ate some Juli Alicea prior to going to bed and woke up during the night with mid to left side abd pain radiating to groin down his leg and up to left shoulder to down his arm. Pt reports this episode was less severe but lasted approx 1 hour. Pt has been having left posterior headaches over the past 7 years. Pt reports headaches are always there but are worse at times. Pt has not been evaluated for headaches. He felt they were due to stress/tension. I discussed ordering an MRI with patient on previous visit. Pt declined due to no medical insurance coverage. He also declines referral to neurology due to insurance coverage. During ER Visit on 01/18/2021, Pt had negative CT of head, Negative CT of lumbar spine, Negative abd CT and negative chest xray. Pt is feeling well today without acute complaints. I have previously recommended an MRI of brain and referral to neurology. Pt continues to decline at this time due to insurance coverage. He is working on getting insurance and wishes to wait until approved. PMH of Anxiety. Pt has not filled Zoloft yet but plans too as soon as he gets enough money to afford this medication. He also reports he has starting counseling sessions with Yolie Franco LCSW and he feels she is going to be very helpful. Chief Complaint   Patient presents with    Follow-up     pateint thinks previous symptoms may be related to glucose or problem with pancreas         Review of Systems   Constitutional: Negative.   Negative for activity change, appetite change, chills, diaphoresis, fatigue, fever and unexpected weight change. HENT: Negative. Eyes: Negative. Respiratory: Negative. Negative for cough and shortness of breath. Cardiovascular: Negative. Negative for chest pain. Gastrointestinal: Positive for abdominal pain (left side abd pain/twitching, radiates to left groin, left left and then up to left arm.- Denies at this time). Negative for diarrhea, nausea and vomiting. Endocrine: Negative. Negative for heat intolerance, polydipsia, polyphagia and polyuria. Genitourinary: Negative. Musculoskeletal: Positive for arthralgias (intermittent left shoulder). Skin: Negative. Allergic/Immunologic: Negative. Neurological: Negative. Numbness: intermittent. Hematological: Negative. Psychiatric/Behavioral: Positive for sleep disturbance. Negative for confusion, decreased concentration, dysphoric mood, hallucinations, self-injury and suicidal ideas. The patient is not nervous/anxious and is not hyperactive. OBJECTIVE:    /77   Pulse 71   Temp 97.3 °F (36.3 °C) (Temporal)   Wt 144 lb 9.6 oz (65.6 kg)   BMI 21.99 kg/m²    Physical Exam  Vitals signs and nursing note reviewed. Constitutional:       General: He is not in acute distress. Appearance: Normal appearance. He is well-developed and normal weight. He is not ill-appearing, toxic-appearing or diaphoretic. HENT:      Head: Normocephalic. Eyes:      Extraocular Movements: Extraocular movements intact. Conjunctiva/sclera: Conjunctivae normal.      Pupils: Pupils are equal, round, and reactive to light. Neck:      Thyroid: No thyromegaly. Vascular: No carotid bruit. Cardiovascular:      Rate and Rhythm: Normal rate and regular rhythm. Heart sounds: Normal heart sounds. No murmur. Pulmonary:      Effort: Pulmonary effort is normal.      Breath sounds: Normal breath sounds. Skin:     General: Skin is warm and dry.    Neurological:      Mental Status: He is alert and oriented to person, place, and time. Psychiatric:         Mood and Affect: Mood normal.         Behavior: Behavior normal.         Thought Content: Thought content normal.         Judgment: Judgment normal.         ASSESSMENT/PLAN:   Mehdi Oakes was seen today for follow-up. Diagnoses and all orders for this visit:    Elevated glucose  -     Hemoglobin A1C; Future  -     POCT Glucose    Abdominal pain, unspecified abdominal location  -     AMYLASE; Future  -     LIPASE; Future    I have encouraged patient to follow up for MRI and referral to neurology as soon as he finds out about medical coverage. Pt asked to delay orders until he knows he has insurance. I have advised patient if symptoms worsen go to the ER>     Return as soon as possible for referral for MRI and neurology. Current Outpatient Medications on File Prior to Visit   Medication Sig Dispense Refill    ibuprofen (ADVIL;MOTRIN) 200 MG tablet Take 200 mg by mouth as needed       aspirin 81 MG EC tablet Take 81 mg by mouth as needed       sertraline (ZOLOFT) 25 MG tablet Take 1 tablet by mouth daily (Patient not taking: Reported on 3/9/2021) 30 tablet 3     No current facility-administered medications on file prior to visit.

## 2021-03-09 NOTE — PATIENT INSTRUCTIONS
Education and Discharge Instructions:  Keep a list of your medicines with you at all times. Always bring a up to date list or the medication bottles when going to the doctor or hospital.   Always follow the directions on your medications unless the doctor or nurse has instructed you otherwise. Keep all medications out of reach of children. Store medicines according to the directions on the label. Seek emergency medical attention if you think you have used too much medication. A overdose can be fatal.  Don't share your medicines with anyone. It may harm them. Discard any unused or out dated medications. If you have any questions, ask your provider or pharmacist for more information. Be sure to keep all appointments for provider visits or tests. Please continue all your medications that the Provider has prescribed for you unless other [facility]    1. Mental Health Info and Treatment [phone]  2. Drug and Alcohol Detox Rehab treatment 24 hr urdydxej-[phone]  3. Stop Smoking Classes- [facility]-[phone]  4. [address] Program- prescription rogzaxushy-[phone]  5. Hospice Care [phone]  6. Adult/Child Protection [id number]-[phone]          . We are committed to providing you with the best care possible. In order to help us achieve these goals please remember to bring all medications, herbal products, and over the counter supplements with you to each visit. If your provider has ordered testing for you, please be sure to follow up with our office if you have not received results within 7 days after the testing took place. *If you receive a survey after visiting one of our offices, please take time to share your experience concerning your physician office visit. These surveys are confidential and no health information about you is shared.   We are eager to improve for you and we are counting on your feedback to help make that happen

## 2021-03-10 ASSESSMENT — ENCOUNTER SYMPTOMS
RESPIRATORY NEGATIVE: 1
ALLERGIC/IMMUNOLOGIC NEGATIVE: 1
NAUSEA: 0
DIARRHEA: 0
COUGH: 0
VOMITING: 0
ABDOMINAL PAIN: 1
EYES NEGATIVE: 1
SHORTNESS OF BREATH: 0

## 2021-03-25 ENCOUNTER — HOSPITAL ENCOUNTER (OUTPATIENT)
Facility: HOSPITAL | Age: 31
Discharge: HOME | End: 2021-03-25

## 2021-03-25 ENCOUNTER — OFFICE VISIT (OUTPATIENT)
Dept: FAMILY MEDICINE CLINIC | Age: 31
End: 2021-03-25

## 2021-03-25 VITALS
OXYGEN SATURATION: 98 % | DIASTOLIC BLOOD PRESSURE: 76 MMHG | TEMPERATURE: 97.3 F | SYSTOLIC BLOOD PRESSURE: 108 MMHG | HEART RATE: 73 BPM | BODY MASS INDEX: 22.03 KG/M2 | WEIGHT: 144.9 LBS

## 2021-03-25 DIAGNOSIS — R79.89 ELEVATED TSH: ICD-10-CM

## 2021-03-25 DIAGNOSIS — R79.89 ELEVATED TSH: Primary | ICD-10-CM

## 2021-03-25 LAB — TSH SERPL DL<=0.05 MIU/L-ACNC: 4.57 UIU/ML (ref 0.27–4.2)

## 2021-03-25 PROCEDURE — 84481 FREE ASSAY (FT-3): CPT

## 2021-03-25 PROCEDURE — 84443 ASSAY THYROID STIM HORMONE: CPT

## 2021-03-25 PROCEDURE — 36415 COLL VENOUS BLD VENIPUNCTURE: CPT

## 2021-03-25 PROCEDURE — 99212 OFFICE O/P EST SF 10 MIN: CPT | Performed by: NURSE PRACTITIONER

## 2021-03-25 PROCEDURE — 86376 MICROSOMAL ANTIBODY EACH: CPT

## 2021-03-25 PROCEDURE — 86800 THYROGLOBULIN ANTIBODY: CPT

## 2021-03-25 ASSESSMENT — ENCOUNTER SYMPTOMS
ABDOMINAL PAIN: 0
CONSTIPATION: 1
DIARRHEA: 0
ALLERGIC/IMMUNOLOGIC NEGATIVE: 1
COUGH: 0
SHORTNESS OF BREATH: 0
NAUSEA: 0
VOMITING: 0

## 2021-03-25 NOTE — PATIENT INSTRUCTIONS
Education and Discharge Instructions:  Keep a list of your medicines with you at all times. Always bring a up to date list or the medication bottles when going to the doctor or hospital.   Always follow the directions on your medications unless the doctor or nurse has instructed you otherwise. Keep all medications out of reach of children. Store medicines according to the directions on the label. Seek emergency medical attention if you think you have used too much medication. A overdose can be fatal.  Don't share your medicines with anyone. It may harm them. Discard any unused or out dated medications. If you have any questions, ask your provider or pharmacist for more information. Be sure to keep all appointments for provider visits or tests. Please continue all your medications that the Provider has prescribed for you unless other [facility]    1. Mental Health Info and Treatment Izsjoch-[phone]  2. Drug and Alcohol Detox Rehab treatment 24 hr tbeahofx-[phone]  3. Stop Smoking Classes- [facility]-[phone]  4. [address] Program- prescription ilvlbhskxi-[phone]  5. Hospice Care Wqys-[phone]  6. Adult/Child Protection [id number]-[phone]          . We are committed to providing you with the best care possible. In order to help us achieve these goals please remember to bring all medications, herbal products, and over the counter supplements with you to each visit. If your provider has ordered testing for you, please be sure to follow up with our office if you have not received results within 7 days after the testing took place. *If you receive a survey after visiting one of our offices, please take time to share your experience concerning your physician office visit. These surveys are confidential and no health information about you is shared.   We are eager to improve for you and we are counting on your feedback to help make that happen

## 2021-03-26 LAB
ANTI-THYROGLOB ABS: 427 IU/ML
T3 FREE: 2.7 PG/ML (ref 2.3–4.2)
THYROID PEROXIDASE (TPO) ABS: 267 IU/ML

## 2021-03-31 DIAGNOSIS — R79.89 ELEVATED TSH: Primary | ICD-10-CM

## 2021-04-22 ENCOUNTER — TELEPHONE (OUTPATIENT)
Dept: FAMILY MEDICINE CLINIC | Age: 31
End: 2021-04-22

## 2021-04-22 NOTE — TELEPHONE ENCOUNTER
Patient's referral, along with all pertinent medical records faxed to the attention of [address] [place] [address] on 04/22/21, they will contact the patient and our office with appointment date/time/location.

## 2021-08-10 ENCOUNTER — OFFICE VISIT (OUTPATIENT)
Dept: ENDOCRINOLOGY | Facility: CLINIC | Age: 31
End: 2021-08-10

## 2021-08-10 VITALS
HEIGHT: 68 IN | DIASTOLIC BLOOD PRESSURE: 80 MMHG | WEIGHT: 143 LBS | BODY MASS INDEX: 21.67 KG/M2 | SYSTOLIC BLOOD PRESSURE: 118 MMHG | HEART RATE: 68 BPM

## 2021-08-10 DIAGNOSIS — E06.3 HASHIMOTO'S THYROIDITIS: ICD-10-CM

## 2021-08-10 DIAGNOSIS — E03.9 ACQUIRED HYPOTHYROIDISM: Primary | ICD-10-CM

## 2021-08-10 PROCEDURE — 99203 OFFICE O/P NEW LOW 30 MIN: CPT | Performed by: INTERNAL MEDICINE

## 2021-08-10 RX ORDER — LEVOTHYROXINE SODIUM 0.05 MG/1
50 TABLET ORAL DAILY
Qty: 90 TABLET | Refills: 3 | Status: SHIPPED | OUTPATIENT
Start: 2021-08-10 | End: 2022-07-13 | Stop reason: SDUPTHER

## 2021-08-10 NOTE — ASSESSMENT & PLAN NOTE
We discussed the diagnosis and autoimmune nature of this.  No goiter on exam.  Continue with periodic neck exams.  He says he has felt better with gluten free diet also.

## 2021-08-10 NOTE — PROGRESS NOTES
Office Note      Date: 08/10/2021  Patient Name: Ger Moreno  MRN: [id number]  : 1990    Chief Complaint   Patient presents with   • Thyroid Problem       History of Present Illness:   Ger Moreno is a 30 y.o. male who presents for Thyroid Problem    He has noted some fatigue and cold intolerance for a couple of years.  He had labs done 3/2021 that showed TSH of 7.  He had labs done again several weeks later and TSH was 4.57.  The TPO and TG abs were positive.  He denies any h/o goiter.  He has noted some pain in the left side of the neck.  He denies any compressive sxs.  He notes some constipation.  He notes some issues with depression and brain fog.     Subjective      Patient was born where: [facility].  Facial radiation exposure: No.  High iodine intake: No  Family hx of thyroid disease: Yes, describe: sisters.    Review of Systems:   Review of Systems   Constitutional: Positive for activity change, appetite change, chills, diaphoresis and fatigue.   HENT: Positive for hearing loss and tinnitus.    Eyes: Positive for photophobia, redness and itching.   Respiratory: Positive for apnea and shortness of breath.    Cardiovascular: Negative.    Gastrointestinal: Positive for abdominal pain, constipation and nausea.   Endocrine: Positive for cold intolerance, heat intolerance, polydipsia, polyphagia and polyuria.   Genitourinary: Negative.    Musculoskeletal: Positive for arthralgias and myalgias.   Skin: Negative.    Allergic/Immunologic: Positive for environmental allergies.   Neurological: Positive for dizziness, speech difficulty, weakness, light-headedness and headaches.   Hematological: Negative.    Psychiatric/Behavioral: Positive for agitation, confusion, decreased concentration, dysphoric mood and sleep disturbance. The patient is nervous/anxious and is hyperactive.        The following portions of the patient's history were reviewed and updated as appropriate: allergies, current  "medications, past family history, past medical history, past social history, past surgical history and problem list.    Objective     Visit Vitals  /80   Pulse 68   Ht 172.7 cm (68\")   Wt 64.9 kg (143 lb)   BMI 21.74 kg/m²       Physical Exam:  Physical Exam  Constitutional:       Appearance: Normal appearance.   HENT:      Head: Normocephalic and atraumatic.   Eyes:      Extraocular Movements: Extraocular movements intact.      Conjunctiva/sclera: Conjunctivae normal.      Pupils: Pupils are equal, round, and reactive to light.   Neck:      Thyroid: No thyroid mass, thyromegaly or thyroid tenderness.   Cardiovascular:      Rate and Rhythm: Normal rate and regular rhythm.      Pulses: Normal pulses.      Heart sounds: Normal heart sounds.   Pulmonary:      Effort: Pulmonary effort is normal.      Breath sounds: Normal breath sounds.   Abdominal:      General: Bowel sounds are normal.      Palpations: Abdomen is soft.   Musculoskeletal:         General: Normal range of motion.      Cervical back: Normal range of motion and neck supple.   Lymphadenopathy:      Cervical: No cervical adenopathy.   Skin:     General: Skin is warm and dry.   Neurological:      General: No focal deficit present.      Mental Status: He is alert.   Psychiatric:         Mood and Affect: Mood normal.         Behavior: Behavior normal.         Thought Content: Thought content normal.         Judgment: Judgment normal.         Labs:    TSH  No results found for: TSHBASE     Free T4  Free T4   Date Value Ref Range Status   03/09/2021 1.03 0.89 - 1.76 ng/dL Final       T3  No results found for: O7ZTMUG      TPO  No results found for: THYROIDAB    TG AB  THYROGLOBULIN ANTIBODY   Date Value Ref Range Status   03/25/2021 427 (H) <115 IU/mL Final       TG  No results found for: THYROGLB    CBC w/DIFF  Lab Results   Component Value Date    WBC 9.6 01/16/2021    RBC 4.09 (L) 01/16/2021    HGB 13.7 01/16/2021    HCT 39.4 (L) 01/16/2021    MCV 96.3 " 01/16/2021    MCH 33.5 (H) 01/16/2021    MCHC 34.8 01/16/2021    RDW 12.0 01/16/2021    MPV 9.5 01/16/2021     01/16/2021    NEUTRORELPCT 62.0 01/16/2021    LYMPHORELPCT 26.7 01/16/2021    MONORELPCT 9.6 01/16/2021    EOSRELPCT 1.1 01/16/2021    BASORELPCT 0.4 01/16/2021    AUTOIGPER 0.2 01/16/2021    NEUTROABS 5.9 01/16/2021    LYMPHSABS 2.6 01/16/2021    MONOSABS 0.9 (H) 01/16/2021    EOSABS 0.1 01/16/2021    BASOSABS 0.0 01/16/2021    AUTOIGNUM 0.0 01/16/2021           Assessment / Plan      Assessment & Plan:  Diagnoses and all orders for this visit:    1. Acquired hypothyroidism (Primary)  Assessment & Plan:  He has mild hypothyroidism.  Will start low dose T4.        2. Hashimoto's thyroiditis  Assessment & Plan:  We discussed the diagnosis and autoimmune nature of this.  No goiter on exam.  Continue with periodic neck exams.  He says he has felt better with gluten free diet also.        Other orders  -     levothyroxine (SYNTHROID, LEVOTHROID) 50 MCG tablet; Take 1 tablet by mouth Daily.  Dispense: 90 tablet; Refill: 3       Return in about 3 months (around 11/10/2021) for Recheck with TSH.    Sudarshan Rodriguez MD   08/10/2021

## 2022-07-13 RX ORDER — LEVOTHYROXINE SODIUM 0.05 MG/1
50 TABLET ORAL DAILY
Qty: 30 TABLET | Refills: 0 | Status: SHIPPED | OUTPATIENT
Start: 2022-07-13 | End: 2022-07-21 | Stop reason: DRUGHIGH

## 2022-07-20 ENCOUNTER — OFFICE VISIT (OUTPATIENT)
Dept: ENDOCRINOLOGY | Facility: CLINIC | Age: 32
End: 2022-07-20

## 2022-07-20 ENCOUNTER — LAB (OUTPATIENT)
Dept: LAB | Facility: HOSPITAL | Age: 32
End: 2022-07-20

## 2022-07-20 VITALS
SYSTOLIC BLOOD PRESSURE: 112 MMHG | DIASTOLIC BLOOD PRESSURE: 77 MMHG | HEIGHT: 68 IN | HEART RATE: 67 BPM | BODY MASS INDEX: 21.37 KG/M2 | OXYGEN SATURATION: 98 % | WEIGHT: 141 LBS

## 2022-07-20 DIAGNOSIS — E03.9 ACQUIRED HYPOTHYROIDISM: Primary | ICD-10-CM

## 2022-07-20 DIAGNOSIS — E06.3 HASHIMOTO'S THYROIDITIS: ICD-10-CM

## 2022-07-20 DIAGNOSIS — E03.9 ACQUIRED HYPOTHYROIDISM: ICD-10-CM

## 2022-07-20 PROCEDURE — 99213 OFFICE O/P EST LOW 20 MIN: CPT | Performed by: INTERNAL MEDICINE

## 2022-07-20 PROCEDURE — 84443 ASSAY THYROID STIM HORMONE: CPT

## 2022-07-20 NOTE — PROGRESS NOTES
"     Office Note      Date: 2022  Patient Name: Ger Moreno  MRN: [id number]  : 1990    Chief Complaint   Patient presents with   • Hypothyroidism       History of Present Illness:   Ger Moreno is a 31 y.o. male who presents for Hypothyroidism    At the last visit we started T4 50mcg qd.  He is taking this correctly.  He isn't taking any interfering meds concurrently.  He hasn't noted any change in the size of his neck.  He denies any compressive sxs.  He notes resolution of constipation.  He notes some issues with fatigue and brain fog.    Subjective      Review of Systems:   Review of Systems   Constitutional: Positive for fatigue.   Cardiovascular: Negative.    Gastrointestinal: Negative.    Endocrine: Negative.        The following portions of the patient's history were reviewed and updated as appropriate: allergies, current medications, past family history, past medical history, past social history, past surgical history and problem list.    Objective     Visit Vitals  /77   Pulse 67   Ht 172.7 cm (68\")   Wt 64 kg (141 lb)   SpO2 98%   BMI 21.44 kg/m²       Physical Exam:  Physical Exam  Constitutional:       Appearance: Normal appearance.   Neck:      Thyroid: No thyroid mass, thyromegaly or thyroid tenderness.   Lymphadenopathy:      Cervical: No cervical adenopathy.   Neurological:      Mental Status: He is alert.         Labs:    TSH  No results found for: TSHBASE     Free T4  Free T4   Date Value Ref Range Status   2021 1.03 0.89 - 1.76 ng/dL Final       T3  No results found for: [id number]      TPO  No results found for: THYROIDAB    TG AB  THYROGLOBULIN ANTIBODY   Date Value Ref Range Status   2021 427 (H) <115 IU/mL Final       TG  No results found for: THYROGLB    CBC w/DIFF  Lab Results   Component Value Date    WBC 9.6 2021    RBC 4.09 (L) 2021    HGB 13.7 2021    HCT 39.4 (L) 2021    MCV 96.3 2021    MCH 33.5 (H) 2021 "    MCHC 34.8 01/16/2021    RDW 12.0 01/16/2021    MPV 9.5 01/16/2021     01/16/2021    NEUTRORELPCT 62.0 01/16/2021    LYMPHORELPCT 26.7 01/16/2021    MONORELPCT 9.6 01/16/2021    EOSRELPCT 1.1 01/16/2021    BASORELPCT 0.4 01/16/2021    AUTOIGPER 0.2 01/16/2021    NEUTROABS 5.9 01/16/2021    LYMPHSABS 2.6 01/16/2021    MONOSABS 0.9 (H) 01/16/2021    EOSABS 0.1 01/16/2021    BASOSABS 0.0 01/16/2021    AUTOIGNUM 0.0 01/16/2021           Assessment / Plan      Assessment & Plan:  Diagnoses and all orders for this visit:    1. Acquired hypothyroidism (Primary)  Assessment & Plan:  Continue T4 tx.  Check TSH.    Orders:  -     TSH; Future    2. Hashimoto's thyroiditis  Assessment & Plan:  No goiter on exam.        Return in about 6 months (around 1/20/2023) for Recheck with TSH.    Sudarshan Rodriguez MD   07/20/2022

## 2022-07-21 LAB — TSH SERPL DL<=0.05 MIU/L-ACNC: 11.3 UIU/ML (ref 0.27–4.2)

## 2022-07-21 RX ORDER — LEVOTHYROXINE SODIUM 0.07 MG/1
75 TABLET ORAL DAILY
Qty: 90 TABLET | Refills: 3 | Status: SHIPPED | OUTPATIENT
Start: 2022-07-21

## 2022-08-10 RX ORDER — LEVOTHYROXINE SODIUM 0.05 MG/1
50 TABLET ORAL DAILY
Qty: 30 TABLET | Refills: 0 | OUTPATIENT
Start: 2022-08-10

## 2022-09-21 ENCOUNTER — OFFICE VISIT (OUTPATIENT)
Dept: FAMILY MEDICINE CLINIC | Age: 32
End: 2022-09-21

## 2022-09-21 ENCOUNTER — HOSPITAL ENCOUNTER (OUTPATIENT)
Facility: HOSPITAL | Age: 32
Discharge: HOME | End: 2022-09-21

## 2022-09-21 VITALS
DIASTOLIC BLOOD PRESSURE: 78 MMHG | HEART RATE: 101 BPM | WEIGHT: 138.7 LBS | HEIGHT: 68 IN | TEMPERATURE: 98 F | RESPIRATION RATE: 16 BRPM | BODY MASS INDEX: 21.02 KG/M2 | SYSTOLIC BLOOD PRESSURE: 124 MMHG | OXYGEN SATURATION: 99 %

## 2022-09-21 DIAGNOSIS — R10.9 ABDOMINAL PAIN, UNSPECIFIED ABDOMINAL LOCATION: ICD-10-CM

## 2022-09-21 DIAGNOSIS — F32.A DEPRESSION, UNSPECIFIED DEPRESSION TYPE: ICD-10-CM

## 2022-09-21 DIAGNOSIS — E06.3 HASHIMOTO'S THYROIDITIS: ICD-10-CM

## 2022-09-21 DIAGNOSIS — K92.1 BLOOD IN STOOL: Primary | ICD-10-CM

## 2022-09-21 DIAGNOSIS — R53.83 FATIGUE, UNSPECIFIED TYPE: ICD-10-CM

## 2022-09-21 PROCEDURE — 84443 ASSAY THYROID STIM HORMONE: CPT

## 2022-09-21 PROCEDURE — 80053 COMPREHEN METABOLIC PANEL: CPT

## 2022-09-21 PROCEDURE — 36415 COLL VENOUS BLD VENIPUNCTURE: CPT

## 2022-09-21 PROCEDURE — 82306 VITAMIN D 25 HYDROXY: CPT

## 2022-09-21 PROCEDURE — 82607 VITAMIN B-12: CPT

## 2022-09-21 PROCEDURE — 85025 COMPLETE CBC W/AUTO DIFF WBC: CPT

## 2022-09-21 PROCEDURE — 82746 ASSAY OF FOLIC ACID SERUM: CPT

## 2022-09-21 PROCEDURE — 99212 OFFICE O/P EST SF 10 MIN: CPT | Performed by: NURSE PRACTITIONER

## 2022-09-21 RX ORDER — LEVOTHYROXINE SODIUM 0.07 MG/1
75 TABLET ORAL DAILY
COMMUNITY

## 2022-09-21 RX ORDER — SERTRALINE HYDROCHLORIDE 25 MG/1
25 TABLET, FILM COATED ORAL DAILY
Qty: 30 TABLET | Refills: 3 | Status: CANCELLED | OUTPATIENT
Start: 2022-09-21

## 2022-09-21 SDOH — ECONOMIC STABILITY: FOOD INSECURITY: WITHIN THE PAST 12 MONTHS, THE FOOD YOU BOUGHT JUST DIDN'T LAST AND YOU DIDN'T HAVE MONEY TO GET MORE.: NEVER TRUE

## 2022-09-21 SDOH — ECONOMIC STABILITY: FOOD INSECURITY: WITHIN THE PAST 12 MONTHS, YOU WORRIED THAT YOUR FOOD WOULD RUN OUT BEFORE YOU GOT MONEY TO BUY MORE.: NEVER TRUE

## 2022-09-21 ASSESSMENT — COLUMBIA-SUICIDE SEVERITY RATING SCALE - C-SSRS
2. HAVE YOU ACTUALLY HAD ANY THOUGHTS OF KILLING YOURSELF?: NO
6. HAVE YOU EVER DONE ANYTHING, STARTED TO DO ANYTHING, OR PREPARED TO DO ANYTHING TO END YOUR LIFE?: NO
1. WITHIN THE PAST MONTH, HAVE YOU WISHED YOU WERE DEAD OR WISHED YOU COULD GO TO SLEEP AND NOT WAKE UP?: NO

## 2022-09-21 ASSESSMENT — SOCIAL DETERMINANTS OF HEALTH (SDOH): HOW HARD IS IT FOR YOU TO PAY FOR THE VERY BASICS LIKE FOOD, HOUSING, MEDICAL CARE, AND HEATING?: NOT HARD AT ALL

## 2022-09-21 ASSESSMENT — ENCOUNTER SYMPTOMS
ABDOMINAL PAIN: 1
VOMITING: 0
DIARRHEA: 0
COUGH: 0
BLOOD IN STOOL: 1
SHORTNESS OF BREATH: 0
ABDOMINAL DISTENTION: 1
NAUSEA: 0
RECTAL PAIN: 1

## 2022-09-21 ASSESSMENT — PATIENT HEALTH QUESTIONNAIRE - PHQ9
SUM OF ALL RESPONSES TO PHQ QUESTIONS 1-9: 11
10. IF YOU CHECKED OFF ANY PROBLEMS, HOW DIFFICULT HAVE THESE PROBLEMS MADE IT FOR YOU TO DO YOUR WORK, TAKE CARE OF THINGS AT HOME, OR GET ALONG WITH OTHER PEOPLE: 2
SUM OF ALL RESPONSES TO PHQ QUESTIONS 1-9: 11
5. POOR APPETITE OR OVEREATING: 1
SUM OF ALL RESPONSES TO PHQ9 QUESTIONS 1 & 2: 3
2. FEELING DOWN, DEPRESSED OR HOPELESS: 1
8. MOVING OR SPEAKING SO SLOWLY THAT OTHER PEOPLE COULD HAVE NOTICED. OR THE OPPOSITE, BEING SO FIGETY OR RESTLESS THAT YOU HAVE BEEN MOVING AROUND A LOT MORE THAN USUAL: 3
7. TROUBLE CONCENTRATING ON THINGS, SUCH AS READING THE NEWSPAPER OR WATCHING TELEVISION: 1
6. FEELING BAD ABOUT YOURSELF - OR THAT YOU ARE A FAILURE OR HAVE LET YOURSELF OR YOUR FAMILY DOWN: 1
9. THOUGHTS THAT YOU WOULD BE BETTER OFF DEAD, OR OF HURTING YOURSELF: 0
3. TROUBLE FALLING OR STAYING ASLEEP: 2
SUM OF ALL RESPONSES TO PHQ QUESTIONS 1-9: 11
1. LITTLE INTEREST OR PLEASURE IN DOING THINGS: 2
SUM OF ALL RESPONSES TO PHQ QUESTIONS 1-9: 11

## 2022-09-21 NOTE — PROGRESS NOTES
SUBJECTIVE:    Pily Domingo is a 32 y.o. male    Patient presents with complaints of abdominal pain, diarrhea and fatigue. Pt c/o abd bloating, mental fatigue and sometimes \"I just feel like I am dying\". He works 5 days per week but has to [employer] thru fatigue\". He reports these symptoms have been ongoing 2-3 years. However, symptoms are worsening. He reports he feels at times he feels like he can not get out of bed due to fatigue. Patient reports these symptoms are worse when he eats certain foods like those with gluten or red meat. Pt also reports he has difficulty thinking clearly. Patient also reports bright red blood in his stool often. Patient also states he had lost some weight gradually over time- a total of about 10lbs. Pt reports some weeks he is fine. Pt reports approx 60 % of stool is diarrhea. He also c/o rectal pain and left lower quadrant abd pain. Positive depression screening today. Pt reports he feels anxious all the time. Pt reports hx of panic attacks that have been treated years ago with Xanax and Klonopin. Pt does not feel symptoms are due to depression. Pt reports he does not feel hopeless. He feels depression symptoms are due to feeling fatigue. He request to see GI before adding SSRI. He is also under the care of Dr Nyasia Rowe. Last saw Dr Nyasia Rowe approx 2 months ago. Pt report TSH was 11 and synthroid was increased from 50 mcg to 75 mcg daily. He has a follow up scheduled in January 2023         Chief Complaint   Patient presents with    Abdominal Pain    Diarrhea    Fatigue        Review of Systems   Constitutional: Negative. Respiratory:  Negative for cough and shortness of breath. Cardiovascular:  Negative for chest pain. Gastrointestinal:  Positive for abdominal distention (\"I feel bloated a lot\"), abdominal pain (left lower quad), blood in stool and rectal pain. Negative for diarrhea, nausea and vomiting.       OBJECTIVE:    /78   Pulse (!) 101   Temp 98 °F (36.7

## 2022-09-21 NOTE — PROGRESS NOTES
Chief Complaint   Patient presents with    Abdominal Pain    Diarrhea    Fatigue     Patient here for complaints of abdominal pain, diarrhea and fatigue. He reports these symptoms have been ongoing 2-3 years. He reports he feels at times he feels like he cant get out of bed. Patient reports these symptoms are worse when he eats certain foods like those with gluten or red meat. Patient also reports bright red blood in his stool occassionally. Patient also states he had lost some weight gradually over time- a total of about 10lbs.      Have you seen any other physician or provider since your last visit yes - Endocrinology     Have you had any other diagnostic tests since your last visit? labs    Have you changed or stopped any medications since your last visit? no

## 2022-09-22 DIAGNOSIS — R53.83 FATIGUE, UNSPECIFIED TYPE: ICD-10-CM

## 2022-09-22 DIAGNOSIS — E06.3 HASHIMOTO'S THYROIDITIS: ICD-10-CM

## 2022-09-22 LAB
A/G RATIO: 1.7 (ref 0.8–2)
ALBUMIN SERPL-MCNC: 5 G/DL (ref 3.4–4.8)
ALP BLD-CCNC: 95 U/L (ref 25–100)
ALT SERPL-CCNC: 140 U/L (ref 4–36)
ANION GAP SERPL CALCULATED.3IONS-SCNC: 14 MMOL/L (ref 3–16)
AST SERPL-CCNC: 227 U/L (ref 8–33)
BASOPHILS ABSOLUTE: 0.1 K/UL (ref 0–0.1)
BASOPHILS RELATIVE PERCENT: 1.3 %
BILIRUB SERPL-MCNC: 0.6 MG/DL (ref 0.3–1.2)
BUN BLDV-MCNC: 7 MG/DL (ref 6–20)
CALCIUM SERPL-MCNC: 9.3 MG/DL (ref 8.5–10.5)
CHLORIDE BLD-SCNC: 100 MMOL/L (ref 98–107)
CO2: 23 MMOL/L (ref 20–30)
CREAT SERPL-MCNC: 0.8 MG/DL (ref 0.4–1.2)
EOSINOPHILS ABSOLUTE: 0.1 K/UL (ref 0–0.4)
EOSINOPHILS RELATIVE PERCENT: 1.6 %
FOLATE: 15.04 NG/ML
GFR AFRICAN AMERICAN: >59
GFR NON-AFRICAN AMERICAN: >59
GLOBULIN: 3 G/DL
GLUCOSE BLD-MCNC: 89 MG/DL (ref 74–106)
HCT VFR BLD CALC: 37.3 % (ref 40–54)
HEMOGLOBIN: 12.2 G/DL (ref 13–18)
IMMATURE GRANULOCYTES #: 0 K/UL
IMMATURE GRANULOCYTES %: 0 % (ref 0–5)
LYMPHOCYTES ABSOLUTE: 1.5 K/UL (ref 1.5–4)
LYMPHOCYTES RELATIVE PERCENT: 33.2 %
MCH RBC QN AUTO: 29.9 PG (ref 27–32)
MCHC RBC AUTO-ENTMCNC: 32.7 G/DL (ref 31–35)
MCV RBC AUTO: 91.4 FL (ref 80–100)
MONOCYTES ABSOLUTE: 0.7 K/UL (ref 0.2–0.8)
MONOCYTES RELATIVE PERCENT: 15.6 %
NEUTROPHILS ABSOLUTE: 2.2 K/UL (ref 2–7.5)
NEUTROPHILS RELATIVE PERCENT: 48.3 %
PDW BLD-RTO: 14.9 % (ref 11–16)
PLATELET # BLD: 216 K/UL (ref 150–400)
PMV BLD AUTO: 9.6 FL (ref 6–10)
POTASSIUM SERPL-SCNC: 4.4 MMOL/L (ref 3.4–5.1)
RBC # BLD: 4.08 M/UL (ref 4.5–6)
SODIUM BLD-SCNC: 137 MMOL/L (ref 136–145)
TOTAL PROTEIN: 8 G/DL (ref 6.4–8.3)
TSH REFLEX: 3.12 UIU/ML (ref 0.27–4.2)
VITAMIN B-12: 711 PG/ML (ref 211–911)
VITAMIN D 25-HYDROXY: 31.5 (ref 32–100)
WBC # BLD: 4.5 K/UL (ref 4–11)

## 2022-09-24 ENCOUNTER — HOSPITAL ENCOUNTER (EMERGENCY)
Facility: HOSPITAL | Age: 32
Discharge: HOME | End: 2022-09-25
Attending: EMERGENCY MEDICINE | Admitting: EMERGENCY MEDICINE

## 2022-09-24 DIAGNOSIS — F10.920 ALCOHOLIC INTOXICATION WITHOUT COMPLICATION: ICD-10-CM

## 2022-09-24 DIAGNOSIS — R53.1 GENERALIZED WEAKNESS: ICD-10-CM

## 2022-09-24 DIAGNOSIS — F10.10 ALCOHOL ABUSE: ICD-10-CM

## 2022-09-24 DIAGNOSIS — F41.8 ANXIETY ABOUT HEALTH: Primary | ICD-10-CM

## 2022-09-24 LAB
ALBUMIN SERPL-MCNC: 4.8 G/DL (ref 3.5–5.2)
ALBUMIN/GLOB SERPL: 1.4 G/DL
ALP SERPL-CCNC: 103 U/L (ref 39–117)
ALT SERPL W P-5'-P-CCNC: 150 U/L (ref 1–41)
ANION GAP SERPL CALCULATED.3IONS-SCNC: 16.5 MMOL/L (ref 5–15)
AST SERPL-CCNC: 202 U/L (ref 1–40)
BASOPHILS # BLD AUTO: 0.05 10*3/MM3 (ref 0–0.2)
BASOPHILS NFR BLD AUTO: 0.9 % (ref 0–1.5)
BILIRUB SERPL-MCNC: 0.4 MG/DL (ref 0–1.2)
BILIRUB UR QL STRIP: NEGATIVE
BUN SERPL-MCNC: 6 MG/DL (ref 6–20)
BUN/CREAT SERPL: 7.9 (ref 7–25)
CALCIUM SPEC-SCNC: 9 MG/DL (ref 8.6–10.5)
CHLORIDE SERPL-SCNC: 96 MMOL/L (ref 98–107)
CLARITY UR: ABNORMAL
CO2 SERPL-SCNC: 24.5 MMOL/L (ref 22–29)
COLOR UR: YELLOW
CREAT SERPL-MCNC: 0.76 MG/DL (ref 0.76–1.27)
DEPRECATED RDW RBC AUTO: 49.2 FL (ref 37–54)
EGFRCR SERPLBLD CKD-EPI 2021: 123.2 ML/MIN/1.73
EOSINOPHIL # BLD AUTO: 0.05 10*3/MM3 (ref 0–0.4)
EOSINOPHIL NFR BLD AUTO: 0.9 % (ref 0.3–6.2)
ERYTHROCYTE [DISTWIDTH] IN BLOOD BY AUTOMATED COUNT: 15 % (ref 12.3–15.4)
ETHANOL BLD-MCNC: 353 MG/DL (ref 0–10)
ETHANOL UR QL: 0.35 %
GLOBULIN UR ELPH-MCNC: 3.5 GM/DL
GLUCOSE SERPL-MCNC: 180 MG/DL (ref 65–99)
GLUCOSE UR STRIP-MCNC: NEGATIVE MG/DL
HCT VFR BLD AUTO: 35.9 % (ref 37.5–51)
HGB BLD-MCNC: 12.3 G/DL (ref 13–17.7)
HGB UR QL STRIP.AUTO: NEGATIVE
IMM GRANULOCYTES # BLD AUTO: 0.02 10*3/MM3 (ref 0–0.05)
IMM GRANULOCYTES NFR BLD AUTO: 0.4 % (ref 0–0.5)
KETONES UR QL STRIP: NEGATIVE
LEUKOCYTE ESTERASE UR QL STRIP.AUTO: NEGATIVE
LIPASE SERPL-CCNC: 57 U/L (ref 13–60)
LYMPHOCYTES # BLD AUTO: 2.56 10*3/MM3 (ref 0.7–3.1)
LYMPHOCYTES NFR BLD AUTO: 48.5 % (ref 19.6–45.3)
MCH RBC QN AUTO: 30.4 PG (ref 26.6–33)
MCHC RBC AUTO-ENTMCNC: 34.3 G/DL (ref 31.5–35.7)
MCV RBC AUTO: 88.9 FL (ref 79–97)
MONOCYTES # BLD AUTO: 0.49 10*3/MM3 (ref 0.1–0.9)
MONOCYTES NFR BLD AUTO: 9.3 % (ref 5–12)
NEUTROPHILS NFR BLD AUTO: 2.11 10*3/MM3 (ref 1.7–7)
NEUTROPHILS NFR BLD AUTO: 40 % (ref 42.7–76)
NITRITE UR QL STRIP: NEGATIVE
NRBC BLD AUTO-RTO: 0 /100 WBC (ref 0–0.2)
PH UR STRIP.AUTO: 6 [PH] (ref 5–8)
PLATELET # BLD AUTO: 184 10*3/MM3 (ref 140–450)
PMV BLD AUTO: 8.5 FL (ref 6–12)
POTASSIUM SERPL-SCNC: 4.2 MMOL/L (ref 3.5–5.2)
PROT SERPL-MCNC: 8.3 G/DL (ref 6–8.5)
PROT UR QL STRIP: NEGATIVE
RBC # BLD AUTO: 4.04 10*6/MM3 (ref 4.14–5.8)
SODIUM SERPL-SCNC: 137 MMOL/L (ref 136–145)
SP GR UR STRIP: <=1.005 (ref 1–1.03)
UROBILINOGEN UR QL STRIP: ABNORMAL
WBC NRBC COR # BLD: 5.28 10*3/MM3 (ref 3.4–10.8)

## 2022-09-24 PROCEDURE — 99284 EMERGENCY DEPT VISIT MOD MDM: CPT

## 2022-09-24 PROCEDURE — 83690 ASSAY OF LIPASE: CPT | Performed by: EMERGENCY MEDICINE

## 2022-09-24 PROCEDURE — 82077 ASSAY SPEC XCP UR&BREATH IA: CPT | Performed by: EMERGENCY MEDICINE

## 2022-09-24 PROCEDURE — 80053 COMPREHEN METABOLIC PANEL: CPT | Performed by: EMERGENCY MEDICINE

## 2022-09-24 PROCEDURE — 93005 ELECTROCARDIOGRAM TRACING: CPT | Performed by: EMERGENCY MEDICINE

## 2022-09-24 PROCEDURE — 85025 COMPLETE CBC W/AUTO DIFF WBC: CPT | Performed by: EMERGENCY MEDICINE

## 2022-09-24 PROCEDURE — 84443 ASSAY THYROID STIM HORMONE: CPT | Performed by: EMERGENCY MEDICINE

## 2022-09-24 PROCEDURE — 81003 URINALYSIS AUTO W/O SCOPE: CPT | Performed by: EMERGENCY MEDICINE

## 2022-09-24 RX ORDER — SODIUM CHLORIDE 0.9 % (FLUSH) 0.9 %
10 SYRINGE (ML) INJECTION AS NEEDED
Status: DISCONTINUED | OUTPATIENT
Start: 2022-09-24 | End: 2022-09-25 | Stop reason: HOSPADM

## 2022-09-24 RX ORDER — HYDROXYZINE HYDROCHLORIDE 25 MG/1
50 TABLET, FILM COATED ORAL ONCE
Status: COMPLETED | OUTPATIENT
Start: 2022-09-24 | End: 2022-09-24

## 2022-09-24 RX ADMIN — SODIUM CHLORIDE 1000 ML: 9 INJECTION, SOLUTION INTRAVENOUS at 23:11

## 2022-09-24 RX ADMIN — HYDROXYZINE HYDROCHLORIDE 50 MG: 25 TABLET, FILM COATED ORAL at 23:55

## 2022-09-25 VITALS
RESPIRATION RATE: 16 BRPM | BODY MASS INDEX: 20.98 KG/M2 | SYSTOLIC BLOOD PRESSURE: 126 MMHG | TEMPERATURE: 98.7 F | HEART RATE: 73 BPM | WEIGHT: 138 LBS | OXYGEN SATURATION: 98 % | DIASTOLIC BLOOD PRESSURE: 64 MMHG

## 2022-09-25 LAB
HOLD SPECIMEN: NORMAL
HOLD SPECIMEN: NORMAL
TSH SERPL DL<=0.05 MIU/L-ACNC: 1.07 UIU/ML (ref 0.27–4.2)
WHOLE BLOOD HOLD COAG: NORMAL
WHOLE BLOOD HOLD SPECIMEN: NORMAL

## 2022-09-25 NOTE — ED PROVIDER NOTES
Subjective   History of Present Illness  31-year-old male presents to the ED with multiple complaints.  Patient's initial complaint is generalized weakness.  Patient also complains of fatigue, intermittent confusion and anxiety.  Patient states that he has been having increased episodes of this over the last year and a half.  He states that he has been seeing his primary care provider and had some basic blood work performed earlier in the week and apparently had some elevation in his LFTs and was told that he may have liver failure and he was concerned that he did and it made his anxiety worse.  He states that he has been having episodes over the last year to 2 years where he has intermittent confusion increased anxiety increased fatigue and generalized weakness.  He states that he had some confusion and difficulty thinking tonight.  He does admit to consuming alcohol regularly and states that he has at least a few drinks every night.  He denies illicit drug use.  He denies nausea vomiting diarrhea or abdominal pain.  No fever or chills.  No chest pain or shortness of breath.  No cough or wheeze.  No other complaints at this time.        Review of Systems   Constitutional: Positive for fatigue.   Neurological: Positive for weakness.   Psychiatric/Behavioral: Positive for confusion.   All other systems reviewed and are negative.      Past Medical History:   Diagnosis Date   • Hashimoto's thyroiditis        Allergies   Allergen Reactions   • Codeine Itching   • Morphine Itching       Past Surgical History:   Procedure Laterality Date   • NO PAST SURGERIES         Family History   Problem Relation Age of Onset   • Thyroid disease Sister        Social History     Socioeconomic History   • Marital status: Single   Tobacco Use   • Smoking status: Former Smoker     Packs/day: 0.25     Years: 5.00     Pack years: 1.25     Types: Cigarettes     Start date: 10/10/2001     Quit date: 8/10/2016     Years since quittin.1   •  Smokeless tobacco: Never Used   Vaping Use   • Vaping Use: Some days   Substance and Sexual Activity   • Alcohol use: Yes     Alcohol/week: 7.0 standard drinks     Types: 7 Cans of beer per week     Comment: occ   • Drug use: Yes     Types: Marijuana   • Sexual activity: Yes     Partners: Female     Birth control/protection: Birth control pill           Objective   Physical Exam  Vitals and nursing note reviewed.   Constitutional:       General: He is not in acute distress.     Appearance: He is well-developed. He is not diaphoretic.   HENT:      Head: Normocephalic and atraumatic.      Nose: Nose normal.   Eyes:      Conjunctiva/sclera: Conjunctivae normal.      Pupils: Pupils are equal, round, and reactive to light.   Cardiovascular:      Rate and Rhythm: Normal rate and regular rhythm.   Pulmonary:      Effort: Pulmonary effort is normal. No respiratory distress.      Breath sounds: Normal breath sounds.   Abdominal:      General: There is no distension.      Palpations: Abdomen is soft.      Tenderness: There is no abdominal tenderness.   Musculoskeletal:         General: No deformity.   Neurological:      Mental Status: He is alert and oriented to person, place, and time.      Cranial Nerves: No cranial nerve deficit.      Coordination: Coordination normal.      Comments: Oriented x4.  No confusion.  No neurological deficits.  Resting comfortably but anxious         Procedures           ED Course  ED Course as of 09/25/22 0323   Sat Sep 24, 2022   2301 EKG interpreted by me.  Sinus rhythm.  Rate of 74.  Sinus arrhythmia with right axis deviation.  No ST segment or T wave changes.  Abnormal EKG [CG]      ED Course User Index  [CG] Harry Oates B, DO                                           MDM  31-year-old male presents to the ED with multiple complaints.  Most of his complaints are chronic and seem to be some anxiety over health conditions.  Labs were very reassuring.  EKG was nonischemic.  Vital signs are  stable.  Alcohol of 350 likely causing most of the patient's symptoms and worsening of his anxiety.  On reevaluation he was resting comfortably after Atarax.  Minimal elevation in his LFTs consistent with alcohol abuse.  No abdominal pain or jaundice.  Abdomen soft nontender nondistended.  Patient did not wish to undergo imaging.  Felt that this was appropriate.  Resting comfortably on reevaluation significant other is driving feels well and would like to be discharged at this time.        Final diagnoses:   Anxiety about health   Generalized weakness   Alcoholic intoxication without complication (HCC)   Alcohol abuse       ED Disposition  ED Disposition     ED Disposition   Discharge    Condition   Stable    Comment   --             Gunjan Gonzalez, APRN  [address] [id number] [phone]               Medication List      No changes were made to your prescriptions during this visit.          Harry Oates, DO  09/25/22 0323

## 2022-09-26 DIAGNOSIS — R79.89 ELEVATED LFTS: Primary | ICD-10-CM

## 2022-09-27 ENCOUNTER — HOSPITAL ENCOUNTER (OUTPATIENT)
Facility: HOSPITAL | Age: 32
Discharge: HOME | End: 2022-09-27

## 2022-09-27 DIAGNOSIS — R79.89 ELEVATED LFTS: Primary | ICD-10-CM

## 2022-09-27 DIAGNOSIS — R79.89 ELEVATED LFTS: ICD-10-CM

## 2022-09-27 LAB
ALBUMIN SERPL-MCNC: 4.6 G/DL (ref 3.4–4.8)
ALP BLD-CCNC: 133 U/L (ref 25–100)
ALT SERPL-CCNC: 162 U/L (ref 4–36)
AST SERPL-CCNC: 238 U/L (ref 8–33)
BILIRUB SERPL-MCNC: 0.7 MG/DL (ref 0.3–1.2)
BILIRUBIN DIRECT: <0.2 MG/DL (ref 0–0.2)
BILIRUBIN, INDIRECT: ABNORMAL MG/DL (ref 0.2–0.8)
TOTAL PROTEIN: 7.5 G/DL (ref 6.4–8.3)

## 2022-09-27 PROCEDURE — 36415 COLL VENOUS BLD VENIPUNCTURE: CPT

## 2022-09-27 PROCEDURE — 80074 ACUTE HEPATITIS PANEL: CPT

## 2022-09-27 PROCEDURE — 80076 HEPATIC FUNCTION PANEL: CPT

## 2022-09-28 LAB
HAV IGM SER IA-ACNC: NORMAL
HEPATITIS B CORE IGM ANTIBODY: NORMAL
HEPATITIS B SURFACE ANTIGEN INTERPRETATION: NORMAL
HEPATITIS C ANTIBODY INTERPRETATION: NORMAL

## 2022-10-03 ENCOUNTER — HOSPITAL ENCOUNTER (OUTPATIENT)
Dept: ULTRASOUND IMAGING | Facility: HOSPITAL | Age: 32
Discharge: HOME | End: 2022-10-03

## 2022-10-03 DIAGNOSIS — R79.89 ELEVATED LFTS: ICD-10-CM

## 2022-10-03 PROCEDURE — 76705 ECHO EXAM OF ABDOMEN: CPT

## 2022-10-05 ENCOUNTER — OFFICE VISIT (OUTPATIENT)
Dept: FAMILY MEDICINE CLINIC | Age: 32
End: 2022-10-05

## 2022-10-05 VITALS
OXYGEN SATURATION: 97 % | HEIGHT: 68 IN | BODY MASS INDEX: 20.96 KG/M2 | RESPIRATION RATE: 18 BRPM | DIASTOLIC BLOOD PRESSURE: 82 MMHG | TEMPERATURE: 98.2 F | SYSTOLIC BLOOD PRESSURE: 122 MMHG | HEART RATE: 93 BPM | WEIGHT: 138.3 LBS

## 2022-10-05 DIAGNOSIS — F41.9 ANXIETY: Primary | ICD-10-CM

## 2022-10-05 PROCEDURE — 99212 OFFICE O/P EST SF 10 MIN: CPT | Performed by: NURSE PRACTITIONER

## 2022-10-05 RX ORDER — PAROXETINE 10 MG/1
10 TABLET, FILM COATED ORAL DAILY
Qty: 30 TABLET | Refills: 3 | Status: SHIPPED | OUTPATIENT
Start: 2022-10-05 | End: 2022-11-03 | Stop reason: SDUPTHER

## 2022-10-05 RX ORDER — CLONAZEPAM 0.5 MG/1
0.5 TABLET ORAL 2 TIMES DAILY PRN
Qty: 60 TABLET | Refills: 0 | Status: SHIPPED | OUTPATIENT
Start: 2022-10-05 | End: 2022-11-03 | Stop reason: SDUPTHER

## 2022-10-05 ASSESSMENT — ENCOUNTER SYMPTOMS
COUGH: 0
BLOOD IN STOOL: 0
SHORTNESS OF BREATH: 0
VOMITING: 0
DIARRHEA: 0
ABDOMINAL PAIN: 0
NAUSEA: 0

## 2022-10-05 NOTE — PROGRESS NOTES
Chief Complaint   Patient presents with    Anxiety     Patient here today for anxiety. Patient states he has struggled with anxiety and panic attacks but usually will have a drink or two and that helps. Patient reports since finding out his liver enzymes were elevated he has increased anxiety and is not drinking alcohol. Patient reports he was given hydroxyzine in the past and it help slightly. He states he did not really help with his \"break through anxiety\". Patient was also provided with [facility] contact number to contact for scheduling related to elevated liver enzymes.    .   Have you seen any other physician or provider since your last visit no    Have you had any other diagnostic tests since your last visit? no    Have you changed or stopped any medications since your last visit? no

## 2022-10-05 NOTE — PROGRESS NOTES
SUBJECTIVE:    Tenisha Tobar is a 32 y.o. male    Pt presents for evaluation and treatment of anxiety. He was noted to have elevated LFTs and has been referred to GI. Pt reports he is having a good day today. He denies abd pain or \"flare up\". He reports he was drinking most days, at least 4 drinks per day. He quit drinking ETOH approx 1.5 weeks ago. Pt reports anxiety is somewhat worse since stopping ETOH. Pt reports he started drinking consistently around age 21 to manage anxiety without medicaitons. Pt reports he was given Vistaril 25 mg daily as needed for anxiety by [address]. He feels Vistaril helps improve anxiety but causes brain fog. He reports he has taken zoloft and doxepin in the past but did not really like how they made him feel. Pt reports he has taken Xanax in the past but he felt it was too strong. He tolerated Klonopin well in the past- before the age of 21. Denies thoughts of suicide or self harm. Chief Complaint   Patient presents with    Anxiety        Review of Systems   Constitutional: Negative. Respiratory:  Negative for cough and shortness of breath. Cardiovascular:  Negative for chest pain. Gastrointestinal:  Negative for abdominal pain, blood in stool, diarrhea, nausea and vomiting. Psychiatric/Behavioral:  Negative for dysphoric mood. The patient is nervous/anxious. \"Brain fog\"      OBJECTIVE:    /82   Pulse 93   Temp 98.2 °F (36.8 °C) (Infrared)   Resp 18   Ht 5' 8\" (1.727 m)   Wt 138 lb 4.8 oz (62.7 kg)   SpO2 97% Comment: RA  BMI 21.03 kg/m²    Physical Exam  Vitals and nursing note reviewed. Constitutional:       Appearance: Normal appearance. He is well-developed. HENT:      Head: Normocephalic. Eyes:      Extraocular Movements: Extraocular movements intact. Conjunctiva/sclera: Conjunctivae normal.      Pupils: Pupils are equal, round, and reactive to light. Neck:      Thyroid: No thyromegaly.       Vascular: No carotid bruit. Cardiovascular:      Rate and Rhythm: Normal rate and regular rhythm. Heart sounds: Normal heart sounds. No murmur heard. Pulmonary:      Effort: Pulmonary effort is normal.      Breath sounds: Normal breath sounds. Skin:     General: Skin is warm and dry. Neurological:      Mental Status: He is alert and oriented to person, place, and time. Psychiatric:         Attention and Perception: Attention and perception normal.         Mood and Affect: Mood and affect normal.         Behavior: Behavior normal.         Thought Content: Thought content normal.         Judgment: Judgment normal.       ASSESSMENT/PLAN:   Otis Kuhn was seen today for anxiety. Diagnoses and all orders for this visit:    Anxiety  -     PARoxetine (PAXIL) 10 MG tablet; Take 1 tablet by mouth daily  -     clonazePAM (KLONOPIN) 0.5 MG tablet; Take 1 tablet by mouth 2 times daily as needed for Anxiety for up to 30 days. Pt agrees to not drink ETOH. He stated \"I can't drink ETOH because my fiance won't let me\". He was advised of the risk of ETOH and benzodiazepines. He verbalized understanding.    -     [address] [place] [address] also referred for peer support.    -Pt given number to [facility] to schedule GI appt. Pt is self pay and they requested to speak to patient prior to scheduling. Controlled Substance Monitoring:    Acute and Chronic Pain Monitoring:   RX Monitoring 10/5/2022   Periodic Controlled Substance Monitoring Possible medication side effects, risk of tolerance/dependence & alternative treatments discussed. ;No signs of potential drug abuse or diversion identified. ;Random urine drug screen sent today. Chronic Pain > 120 MEDD Obtained or confirmed \"Medication Contract\" on file. Emily Pulling completed and compliant. Medication agreement on chart. No follow-ups on file.     Current Outpatient Medications on File Prior to Visit   Medication Sig Dispense Refill levothyroxine (SYNTHROID) 75 MCG tablet Take 75 mcg by mouth Daily      aspirin 81 MG EC tablet Take 81 mg by mouth as needed        No current facility-administered medications on file prior to visit.

## 2022-10-12 ENCOUNTER — TELEPHONE (OUTPATIENT)
Dept: SOCIAL WORK | Facility: HOSPITAL | Age: 32
End: 2022-10-12

## 2022-10-13 ENCOUNTER — TELEPHONE (OUTPATIENT)
Dept: SOCIAL WORK | Facility: HOSPITAL | Age: 32
End: 2022-10-13

## 2022-10-19 ENCOUNTER — TELEPHONE (OUTPATIENT)
Dept: SOCIAL WORK | Facility: HOSPITAL | Age: 32
End: 2022-10-19

## 2022-10-19 NOTE — TELEPHONE ENCOUNTER
Call went through by provider. No answer from patient.  Message could not be left due to voicemail not being set up

## 2022-10-26 ENCOUNTER — TELEPHONE (OUTPATIENT)
Dept: SOCIAL WORK | Facility: HOSPITAL | Age: 32
End: 2022-10-26

## 2022-11-03 DIAGNOSIS — F41.9 ANXIETY: ICD-10-CM

## 2022-11-03 RX ORDER — PAROXETINE 10 MG/1
10 TABLET, FILM COATED ORAL DAILY
Qty: 30 TABLET | Refills: 3 | Status: SHIPPED | OUTPATIENT
Start: 2022-11-03

## 2022-11-03 RX ORDER — CLONAZEPAM 0.5 MG/1
0.5 TABLET ORAL 2 TIMES DAILY PRN
Qty: 60 TABLET | Refills: 0 | Status: SHIPPED | OUTPATIENT
Start: 2022-11-03 | End: 2022-12-03

## 2022-11-21 NOTE — TELEPHONE ENCOUNTER
Pt has an appt on  7/20 can we refill enough  Pt is out can we call today  Please call in levothyroxine ibrahima schreiber  
[phone]

## 2022-12-20 ENCOUNTER — APPOINTMENT (OUTPATIENT)
Dept: GENERAL RADIOLOGY | Facility: HOSPITAL | Age: 32
End: 2022-12-20

## 2022-12-20 ENCOUNTER — HOSPITAL ENCOUNTER (EMERGENCY)
Facility: HOSPITAL | Age: 32
Discharge: HOME | End: 2022-12-20
Attending: FAMILY MEDICINE

## 2022-12-20 VITALS
RESPIRATION RATE: 16 BRPM | HEART RATE: 81 BPM | HEIGHT: 68 IN | OXYGEN SATURATION: 98 % | DIASTOLIC BLOOD PRESSURE: 86 MMHG | BODY MASS INDEX: 21.98 KG/M2 | SYSTOLIC BLOOD PRESSURE: 133 MMHG | TEMPERATURE: 98.5 F | WEIGHT: 145 LBS

## 2022-12-20 DIAGNOSIS — M79.671 RIGHT FOOT PAIN: Primary | ICD-10-CM

## 2022-12-20 PROCEDURE — 99283 EMERGENCY DEPT VISIT LOW MDM: CPT

## 2022-12-20 PROCEDURE — 73630 X-RAY EXAM OF FOOT: CPT

## 2022-12-20 PROCEDURE — 6370000000 HC RX 637 (ALT 250 FOR IP): Performed by: FAMILY MEDICINE

## 2022-12-20 RX ORDER — ACETAMINOPHEN 325 MG/1
650 TABLET ORAL ONCE
Status: COMPLETED | OUTPATIENT
Start: 2022-12-20 | End: 2022-12-20

## 2022-12-20 RX ORDER — IBUPROFEN 600 MG/1
600 TABLET ORAL ONCE
Status: COMPLETED | OUTPATIENT
Start: 2022-12-20 | End: 2022-12-20

## 2022-12-20 RX ADMIN — ACETAMINOPHEN 650 MG: 325 TABLET, FILM COATED ORAL at 19:49

## 2022-12-20 RX ADMIN — IBUPROFEN 600 MG: 600 TABLET ORAL at 19:49

## 2022-12-20 ASSESSMENT — PAIN DESCRIPTION - ORIENTATION
ORIENTATION: RIGHT
ORIENTATION: RIGHT

## 2022-12-20 ASSESSMENT — ENCOUNTER SYMPTOMS: COLOR CHANGE: 1

## 2022-12-20 ASSESSMENT — LIFESTYLE VARIABLES
HOW OFTEN DO YOU HAVE A DRINK CONTAINING ALCOHOL: 4 OR MORE TIMES A WEEK
HOW MANY STANDARD DRINKS CONTAINING ALCOHOL DO YOU HAVE ON A TYPICAL DAY: 1 OR 2

## 2022-12-20 ASSESSMENT — PAIN SCALES - GENERAL
PAINLEVEL_OUTOF10: 3

## 2022-12-20 ASSESSMENT — PAIN DESCRIPTION - LOCATION
LOCATION: FOOT
LOCATION: FOOT

## 2022-12-20 ASSESSMENT — PAIN DESCRIPTION - PAIN TYPE: TYPE: ACUTE PAIN

## 2022-12-20 ASSESSMENT — PAIN DESCRIPTION - DESCRIPTORS
DESCRIPTORS: ACHING
DESCRIPTORS: SHARP;ACHING

## 2022-12-20 ASSESSMENT — PAIN - FUNCTIONAL ASSESSMENT: PAIN_FUNCTIONAL_ASSESSMENT: 0-10

## 2022-12-20 NOTE — ED TRIAGE NOTES
PT FELL on Wednesday he states about 7 feet and landed on his right heel. He advised that he did go to an er last night but had to leave before he could complete his visit. He did have xrays but does not know results. He rates his pain 3/10 at this time. His pain is 8/10 with ambulation.

## 2022-12-21 NOTE — ED NOTES
Reviewed discharge plan with Adrienne Fuentes. Encouraged him to f/u with VERONICA Rodrigez NP and he understood. NAD noted on discharge, gait steady. Reviewed discharge prescription for:     Current Discharge Medication List        START taking these medications    Details   diclofenac (VOLTAREN) 50 MG EC tablet Take 1 tablet by mouth every 8 hours as needed for Pain  Qty: 15 tablet, Refills: 0             Elie states understanding of how and when to take medications.       Electronically signed by Olaf Harper RN on 12/20/2022 at 8:18 PM       Olaf Harper RN  12/20/22 2018

## 2022-12-21 NOTE — ED PROVIDER NOTES
[address] [place] [address] Court  eMERGENCY dEPARTMENT eNCOUnter      Pt Name: Berhane Feldman  MRN: [id number]  [place] 1990  Date of evaluation: 12/20/2022  Provider: Melo Barnes, [address] [place] [address]       Chief Complaint   Patient presents with    Foot Injury         HISTORY OF PRESENT ILLNESS   (Location/Symptom, Timing/Onset, Context/Setting, Quality, Duration, Modifying Factors, Severity)  Note limiting factors. Berhane Feldman is a 28 y.o. male who presents to the emergency department for having fall and foot injury. Pt states that he fell from a balcony without railing. Didn't realize he stepped backwards nearly off it, quickly turned on way down and landed with right foot more to the heel. Happened 6 days ago. Pt with pain to his heel and bruise to the mid center foot. Been limping on it. Been using crutch. Pain 3/10 at rest, 8/10 while walking. Went to ER in [place] last night, had Xray done but wait time was long and had to catch flight and unable to stay for final reading/disposition. No ankle tenderness. No samy's tenderness. No dorsum mid foot tenderness. No toe pain. Nursing Notes were reviewed. REVIEW OF SYSTEMS    (2-9 systems for level 4, 10 or more forlevel 5)     Review of Systems   Musculoskeletal:  Positive for arthralgias and gait problem. Skin:  Positive for color change. Bruise to right mid foot centrally   All other systems reviewed and are negative. PAST MEDICAL HISTORY     Past Medical History:   Diagnosis Date    Anxiety     Broken shoulder, right, open, initial encounter 01/16/2018    clavical and scapula    Hashimoto's disease     Headache          SURGICAL HISTORY     History reviewed. No pertinent surgical history. CURRENT MEDICATIONS       Previous Medications    CLONAZEPAM (KLONOPIN) 0.5 MG TABLET    Take 1 tablet by mouth 2 times daily as needed for Anxiety for up to 30 days.     LEVOTHYROXINE (SYNTHROID) 75 MCG TABLET Take 75 mcg by mouth Daily       ALLERGIES     Patient has no known allergies. FAMILY HISTORY       Family History   Problem Relation Age of Onset    Schizophrenia Mother     Bipolar Disorder Mother     Heart Disease Father     Thyroid Disease Sister     Drug Abuse Brother     Depression Brother         unknown if due to drug abuse or depression- suicide          SOCIAL HISTORY       Social History     Socioeconomic History    Marital status: Single     Spouse name: None    Number of children: None    Years of education: None    Highest education level: None   Tobacco Use    Smoking status: Former     Types: Cigars    Smokeless tobacco: Never   Vaping Use    Vaping Use: Every day    Start date: 1/1/2019    Substances: Nicotine, Flavoring (occasional)    Devices: Pre-filled or refillable cartridge   Substance and Sexual Activity    Alcohol use: Yes     Alcohol/week: 2.0 standard drinks     Types: 2 Cans of beer per week     Comment: every day    Drug use: Yes     Frequency: 7.0 times per week     Types: Marijuana Aloma Catrina)     Comment: nightly at bedtime    Sexual activity: Yes     Partners: Female     Social Determinants of Health     Financial Resource Strain: Low Risk     Difficulty of Paying Living Expenses: Not hard at all   Food Insecurity: No Food Insecurity    Worried About [address] [place] [address] [employer] in the Last Year: Never true    Ran Out of Food in the Last Year: Never true       SCREENINGS    [place] Coma Scale  Eye Opening: Spontaneous  Best Verbal Response: Oriented  Best Motor Response: Obeys commands  [place] Coma Scale Score: 15        PHYSICAL EXAM    (up to 7 for level 4, 8 or more for level 5)     ED Triage Vitals [12/20/22 1901]   BP Temp Temp Source Heart Rate Resp SpO2 Height Weight   (!) 145/78 98.5 °F (36.9 °C) Oral 81 16 99 % 5' 8\" (1.727 m) 145 lb (65.8 kg)       Physical Exam  Vitals and nursing note reviewed. Constitutional:       General: He is not in acute distress.      Appearance: Normal appearance. HENT:      Head: Normocephalic. Eyes:      Extraocular Movements: Extraocular movements intact. Pupils: Pupils are equal, round, and reactive to light. Cardiovascular:      Rate and Rhythm: Normal rate and regular rhythm. Heart sounds: Normal heart sounds. Pulmonary:      Effort: Pulmonary effort is normal.      Breath sounds: Normal breath sounds. Musculoskeletal:         General: Tenderness and signs of injury present. No swelling or deformity. Cervical back: Neck supple. Right lower leg: No edema. Feet:    Skin:     General: Skin is warm and dry. Comments: Small quarter circular sized area of ecchymosis to the plantar foot centrally just below distal 3rd MTP area   Neurological:      Mental Status: He is alert and oriented to person, place, and time. Psychiatric:         Mood and Affect: Mood normal.         Behavior: Behavior normal.       DIAGNOSTIC RESULTS     EKG: All EKG's are interpreted by the Emergency Department Physician who either signs or Co-signs this chart in the absence of a cardiologist.    None    RADIOLOGY:   Non-plain film images such as CT, Ultrasound and MRI are read by the radiologist. Plain radiographic images are visualized andpreliminarily interpreted by the emergency physician with the below findings:    Right Foot - See Below    Interpretationper the Radiologist below, if available at the time of this note:    XR FOOT RIGHT (MIN 3 VIEWS)   Final Result   Impression:   No acute osseous injury. ED BEDSIDE ULTRASOUND:   Performed by ED Physician - none    LABS:  Labs Reviewed - No data to display    All other labs were within normal range or not returned as of this dictation.     EMERGENCY DEPARTMENT COURSE and DIFFERENTIAL DIAGNOSIS/MDM:   :    Vitals:    12/20/22 1901   BP: (!) 145/78   Pulse: 81   Resp: 16   Temp: 98.5 °F (36.9 °C)   TempSrc: Oral   SpO2: 99%   Weight: 145 lb (65.8 kg)   Height: 5' 8\" (1.727 m) CRITICAL CARE TIME   Total Critical Care time was 0 minutes, excluding separatelyreportable procedures. There was a high probability ofclinically significant/life threatening deterioration in the patient's condition which required my urgent intervention. CONSULTS:  None    PROCEDURES:  None    PROGRESS NOTES:    Pt with tenderness to right foot. He fell 6 days ago from [facility] and landed on his right foot. Height was around 7 feet. Pain to heel area. Took Tylenol this morning which helped but wearing off. Caryn Mcdonald he has high pain tolerance and where it was still hurting his girlfriend wanted him to get it checked out so he wasn't walking on a broken foot. Motrin/Tylenol ordered. No obvious fracture to the foot that I can see. Will await final radiology reading  Reading negative. Pt has crutches at home. Discussed best to stay off it for couple days to not keep stretching and limiting body repair walking on it. Will give NSAIDs for pain to use and can use Tylenol on top of it. Is this patient to be included in the SEP-1 Core Measure due to severe sepsis or septic shock? No   Exclusion criteria - the patient is NOT to be included for SEP-1 Core Measure due to: Infection is not suspected     FINAL IMPRESSION      1. Right foot pain New Problem         DISPOSITION/PLAN   DISPOSITION Decision To Discharge 12/20/2022 07:57:49 PM      PATIENT REFERRED TO:    Pt to use crutches to stay off foot for next couple days, sent pain meds to the pharmacy. May add over the counter tylenol to medication.  See primary care if pain continues over next week or two        DISCHARGE MEDICATIONS:  New Prescriptions    DICLOFENAC (VOLTAREN) 50 MG EC TABLET    Take 1 tablet by mouth every 8 hours as needed for Pain       (Please note that portions of this note were completed with a voice recognition program.  Efforts were made to edit the dictations but occasionallywords are mis-transcribed.)    Bhavik Flannery, DO (electronically signed)  Attending Emergency Physician          Ras Steinberg DO  12/20/22 2002

## 2023-03-23 RX ORDER — LEVOTHYROXINE SODIUM 0.05 MG/1
50 TABLET ORAL DAILY
Qty: 90 TABLET | Refills: 3 | OUTPATIENT
Start: 2023-03-23

## 2023-04-11 DIAGNOSIS — D64.9 ANEMIA, UNSPECIFIED TYPE: ICD-10-CM

## 2023-04-16 ENCOUNTER — HOSPITAL ENCOUNTER (OUTPATIENT)
Facility: HOSPITAL | Age: 33
Discharge: HOME | End: 2023-04-16
Payer: COMMERCIAL

## 2023-04-16 ENCOUNTER — HOSPITAL ENCOUNTER (OUTPATIENT)
Dept: GENERAL RADIOLOGY | Facility: HOSPITAL | Age: 33
Discharge: HOME | End: 2023-04-16
Payer: COMMERCIAL

## 2023-04-16 DIAGNOSIS — M25.511 CHRONIC RIGHT SHOULDER PAIN: ICD-10-CM

## 2023-04-16 DIAGNOSIS — G89.29 CHRONIC RIGHT SHOULDER PAIN: ICD-10-CM

## 2023-04-16 PROCEDURE — 73030 X-RAY EXAM OF SHOULDER: CPT

## 2023-04-24 ENCOUNTER — OFFICE VISIT (OUTPATIENT)
Dept: FAMILY MEDICINE CLINIC | Age: 33
End: 2023-04-24
Payer: COMMERCIAL

## 2023-04-24 VITALS
WEIGHT: 147.6 LBS | DIASTOLIC BLOOD PRESSURE: 62 MMHG | SYSTOLIC BLOOD PRESSURE: 112 MMHG | HEIGHT: 68 IN | OXYGEN SATURATION: 97 % | TEMPERATURE: 98.6 F | RESPIRATION RATE: 18 BRPM | BODY MASS INDEX: 22.37 KG/M2 | HEART RATE: 87 BPM

## 2023-04-24 DIAGNOSIS — M25.511 CHRONIC RIGHT SHOULDER PAIN: Primary | ICD-10-CM

## 2023-04-24 DIAGNOSIS — G89.29 CHRONIC RIGHT SHOULDER PAIN: Primary | ICD-10-CM

## 2023-04-24 DIAGNOSIS — F41.9 ANXIETY: ICD-10-CM

## 2023-04-24 PROCEDURE — 99213 OFFICE O/P EST LOW 20 MIN: CPT | Performed by: NURSE PRACTITIONER

## 2023-04-24 RX ORDER — CLONAZEPAM 1 MG/1
1 TABLET ORAL 2 TIMES DAILY PRN
Qty: 60 TABLET | Refills: 0 | Status: SHIPPED | OUTPATIENT
Start: 2023-04-24 | End: 2023-05-24

## 2023-04-24 RX ORDER — IBUPROFEN 600 MG/1
600 TABLET ORAL 2 TIMES DAILY PRN
Qty: 90 TABLET | Refills: 0 | Status: SHIPPED | OUTPATIENT
Start: 2023-04-24

## 2023-04-24 SDOH — ECONOMIC STABILITY: HOUSING INSECURITY
IN THE LAST 12 MONTHS, WAS THERE A TIME WHEN YOU DID NOT HAVE A STEADY PLACE TO SLEEP OR SLEPT IN A SHELTER (INCLUDING NOW)?: NO

## 2023-04-24 SDOH — ECONOMIC STABILITY: FOOD INSECURITY: WITHIN THE PAST 12 MONTHS, THE FOOD YOU BOUGHT JUST DIDN'T LAST AND YOU DIDN'T HAVE MONEY TO GET MORE.: NEVER TRUE

## 2023-04-24 SDOH — ECONOMIC STABILITY: TRANSPORTATION INSECURITY
IN THE PAST 12 MONTHS, HAS LACK OF TRANSPORTATION KEPT YOU FROM MEETINGS, WORK, OR FROM GETTING THINGS NEEDED FOR DAILY LIVING?: NO

## 2023-04-24 SDOH — ECONOMIC STABILITY: INCOME INSECURITY: HOW HARD IS IT FOR YOU TO PAY FOR THE VERY BASICS LIKE FOOD, HOUSING, MEDICAL CARE, AND HEATING?: SOMEWHAT HARD

## 2023-04-24 SDOH — ECONOMIC STABILITY: FOOD INSECURITY: WITHIN THE PAST 12 MONTHS, YOU WORRIED THAT YOUR FOOD WOULD RUN OUT BEFORE YOU GOT MONEY TO BUY MORE.: NEVER TRUE

## 2023-04-24 ASSESSMENT — ENCOUNTER SYMPTOMS
SHORTNESS OF BREATH: 0
EYES NEGATIVE: 1
COUGH: 0
DIARRHEA: 0
ALLERGIC/IMMUNOLOGIC NEGATIVE: 1
RESPIRATORY NEGATIVE: 1
VOMITING: 0
ABDOMINAL PAIN: 0
BACK PAIN: 0
NAUSEA: 0
GASTROINTESTINAL NEGATIVE: 1
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
## 2023-04-28 RX ORDER — LEVOTHYROXINE SODIUM 0.07 MG/1
75 TABLET ORAL DAILY
Qty: 90 TABLET | Refills: 3 | Status: SHIPPED | OUTPATIENT
Start: 2023-04-28

## 2023-04-28 NOTE — TELEPHONE ENCOUNTER
Pt called requesting a prescription for Levothyroxine 75 mcg Pt last f/u 07/20/22 pt next f/u 05/22/23

## 2023-05-04 ENCOUNTER — HOSPITAL ENCOUNTER (EMERGENCY)
Facility: HOSPITAL | Age: 33
Discharge: HOME | End: 2023-05-05
Attending: FAMILY MEDICINE
Payer: COMMERCIAL

## 2023-05-04 VITALS
WEIGHT: 148 LBS | TEMPERATURE: 97.6 F | DIASTOLIC BLOOD PRESSURE: 84 MMHG | HEIGHT: 68 IN | OXYGEN SATURATION: 100 % | BODY MASS INDEX: 22.43 KG/M2 | HEART RATE: 65 BPM | RESPIRATION RATE: 18 BRPM | SYSTOLIC BLOOD PRESSURE: 130 MMHG

## 2023-05-04 DIAGNOSIS — M54.12 CERVICAL RADICULITIS: ICD-10-CM

## 2023-05-04 DIAGNOSIS — M25.511 ACUTE PAIN OF RIGHT SHOULDER: Primary | ICD-10-CM

## 2023-05-04 DIAGNOSIS — M89.8X1 PAIN OF RIGHT SCAPULA: ICD-10-CM

## 2023-05-04 PROCEDURE — 96372 THER/PROPH/DIAG INJ SC/IM: CPT

## 2023-05-04 PROCEDURE — 99284 EMERGENCY DEPT VISIT MOD MDM: CPT

## 2023-05-04 ASSESSMENT — PAIN DESCRIPTION - ORIENTATION: ORIENTATION: RIGHT

## 2023-05-04 ASSESSMENT — LIFESTYLE VARIABLES
HOW OFTEN DO YOU HAVE A DRINK CONTAINING ALCOHOL: MONTHLY OR LESS
HOW OFTEN DO YOU HAVE A DRINK CONTAINING ALCOHOL: MONTHLY OR LESS
HOW MANY STANDARD DRINKS CONTAINING ALCOHOL DO YOU HAVE ON A TYPICAL DAY: 3 OR 4

## 2023-05-04 ASSESSMENT — PAIN DESCRIPTION - LOCATION: LOCATION: SHOULDER

## 2023-05-04 ASSESSMENT — PAIN SCALES - GENERAL: PAINLEVEL_OUTOF10: 7

## 2023-05-04 ASSESSMENT — PAIN - FUNCTIONAL ASSESSMENT: PAIN_FUNCTIONAL_ASSESSMENT: 0-10

## 2023-05-04 ASSESSMENT — PAIN DESCRIPTION - DESCRIPTORS: DESCRIPTORS: TINGLING;PINS AND NEEDLES

## 2023-05-05 ENCOUNTER — APPOINTMENT (OUTPATIENT)
Dept: GENERAL RADIOLOGY | Facility: HOSPITAL | Age: 33
End: 2023-05-05
Payer: COMMERCIAL

## 2023-05-05 PROCEDURE — 73030 X-RAY EXAM OF SHOULDER: CPT

## 2023-05-05 PROCEDURE — 6370000000 HC RX 637 (ALT 250 FOR IP): Performed by: FAMILY MEDICINE

## 2023-05-05 PROCEDURE — 6360000002 HC RX W HCPCS: Performed by: FAMILY MEDICINE

## 2023-05-05 RX ORDER — TRAMADOL HYDROCHLORIDE 50 MG/1
50 TABLET ORAL
Qty: 12 TABLET | Refills: 0 | Status: SHIPPED | OUTPATIENT
Start: 2023-05-05 | End: 2023-05-08

## 2023-05-05 RX ORDER — CYCLOBENZAPRINE HCL 10 MG
10 TABLET ORAL 3 TIMES DAILY PRN
Qty: 21 TABLET | Refills: 0 | Status: SHIPPED | OUTPATIENT
Start: 2023-05-05 | End: 2023-05-12

## 2023-05-05 RX ORDER — HYDROCODONE BITARTRATE AND ACETAMINOPHEN 5; 325 MG/1; MG/1
1 TABLET ORAL ONCE
Status: COMPLETED | OUTPATIENT
Start: 2023-05-05 | End: 2023-05-05

## 2023-05-05 RX ORDER — DIPHENHYDRAMINE HCL 25 MG
25 CAPSULE ORAL
Status: COMPLETED | OUTPATIENT
Start: 2023-05-05 | End: 2023-05-05

## 2023-05-05 RX ORDER — KETOROLAC TROMETHAMINE 30 MG/ML
30 INJECTION, SOLUTION INTRAMUSCULAR; INTRAVENOUS ONCE
Status: COMPLETED | OUTPATIENT
Start: 2023-05-05 | End: 2023-05-05

## 2023-05-05 RX ORDER — PREDNISONE 20 MG/1
TABLET ORAL
Qty: 4 TABLET | Refills: 0 | Status: SHIPPED | OUTPATIENT
Start: 2023-05-05

## 2023-05-05 RX ADMIN — KETOROLAC TROMETHAMINE 30 MG: 30 INJECTION, SOLUTION INTRAMUSCULAR; INTRAVENOUS at 01:09

## 2023-05-05 RX ADMIN — DIPHENHYDRAMINE HYDROCHLORIDE 25 MG: 25 CAPSULE ORAL at 01:10

## 2023-05-05 RX ADMIN — HYDROCODONE BITARTRATE AND ACETAMINOPHEN 1 TABLET: 5; 325 TABLET ORAL at 01:10

## 2023-05-05 ASSESSMENT — PAIN - FUNCTIONAL ASSESSMENT: PAIN_FUNCTIONAL_ASSESSMENT: 0-10

## 2023-05-05 ASSESSMENT — PAIN SCALES - GENERAL
PAINLEVEL_OUTOF10: 6
PAINLEVEL_OUTOF10: 8
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
## 2023-05-16 ENCOUNTER — HOSPITAL ENCOUNTER (EMERGENCY)
Facility: HOSPITAL | Age: 33
Discharge: HOME | End: 2023-05-16
Attending: FAMILY MEDICINE
Payer: COMMERCIAL

## 2023-05-16 VITALS
SYSTOLIC BLOOD PRESSURE: 119 MMHG | RESPIRATION RATE: 16 BRPM | BODY MASS INDEX: 22.43 KG/M2 | OXYGEN SATURATION: 100 % | HEART RATE: 82 BPM | WEIGHT: 148 LBS | TEMPERATURE: 98.1 F | DIASTOLIC BLOOD PRESSURE: 85 MMHG | HEIGHT: 68 IN

## 2023-05-16 DIAGNOSIS — M25.511 ACUTE PAIN OF RIGHT SHOULDER: Primary | ICD-10-CM

## 2023-05-16 PROCEDURE — 6370000000 HC RX 637 (ALT 250 FOR IP): Performed by: FAMILY MEDICINE

## 2023-05-16 PROCEDURE — 99283 EMERGENCY DEPT VISIT LOW MDM: CPT

## 2023-05-16 RX ORDER — TRAMADOL HYDROCHLORIDE 50 MG/1
50 TABLET ORAL EVERY 4 HOURS PRN
Qty: 15 TABLET | Refills: 0 | Status: SHIPPED | OUTPATIENT
Start: 2023-05-16 | End: 2023-05-20

## 2023-05-16 RX ORDER — TRAMADOL HYDROCHLORIDE 50 MG/1
50 TABLET ORAL ONCE
Status: COMPLETED | OUTPATIENT
Start: 2023-05-16 | End: 2023-05-16

## 2023-05-16 RX ORDER — GABAPENTIN 300 MG/1
300 CAPSULE ORAL EVERY 8 HOURS PRN
Qty: 9 CAPSULE | Refills: 0 | Status: SHIPPED | OUTPATIENT
Start: 2023-05-16 | End: 2023-05-19

## 2023-05-16 RX ORDER — GABAPENTIN 300 MG/1
300 CAPSULE ORAL ONCE
Status: COMPLETED | OUTPATIENT
Start: 2023-05-16 | End: 2023-05-16

## 2023-05-16 RX ADMIN — GABAPENTIN 300 MG: 300 CAPSULE ORAL at 21:38

## 2023-05-16 RX ADMIN — TRAMADOL HYDROCHLORIDE 50 MG: 50 TABLET, COATED ORAL at 21:38

## 2023-05-16 ASSESSMENT — PAIN DESCRIPTION - ORIENTATION: ORIENTATION: RIGHT

## 2023-05-16 ASSESSMENT — PAIN - FUNCTIONAL ASSESSMENT: PAIN_FUNCTIONAL_ASSESSMENT: 0-10

## 2023-05-16 ASSESSMENT — PAIN DESCRIPTION - PAIN TYPE: TYPE: ACUTE PAIN

## 2023-05-16 ASSESSMENT — PAIN DESCRIPTION - LOCATION: LOCATION: SHOULDER

## 2023-05-16 ASSESSMENT — PAIN DESCRIPTION - ONSET: ONSET: ON-GOING
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
## 2023-06-15 ENCOUNTER — OFFICE VISIT (OUTPATIENT)
Dept: FAMILY MEDICINE CLINIC | Age: 33
End: 2023-06-15
Payer: COMMERCIAL

## 2023-06-15 VITALS
TEMPERATURE: 98.5 F | BODY MASS INDEX: 21.53 KG/M2 | HEART RATE: 96 BPM | SYSTOLIC BLOOD PRESSURE: 126 MMHG | WEIGHT: 142.06 LBS | DIASTOLIC BLOOD PRESSURE: 62 MMHG | OXYGEN SATURATION: 97 % | HEIGHT: 68 IN

## 2023-06-15 DIAGNOSIS — F41.9 ANXIETY: ICD-10-CM

## 2023-06-15 DIAGNOSIS — D50.9 IRON DEFICIENCY ANEMIA, UNSPECIFIED IRON DEFICIENCY ANEMIA TYPE: ICD-10-CM

## 2023-06-15 DIAGNOSIS — R52 BODY ACHES: Primary | ICD-10-CM

## 2023-06-15 DIAGNOSIS — K52.9 ACUTE GASTROENTERITIS: ICD-10-CM

## 2023-06-15 LAB
Lab: NORMAL
QC PASS/FAIL: NORMAL
S PYO AG THROAT QL: NORMAL
SARS-COV-2 RDRP RESP QL NAA+PROBE: NEGATIVE

## 2023-06-15 PROCEDURE — 99214 OFFICE O/P EST MOD 30 MIN: CPT | Performed by: NURSE PRACTITIONER

## 2023-06-15 RX ORDER — FERROUS SULFATE 325(65) MG
325 TABLET ORAL
Qty: 30 TABLET | Refills: 1 | Status: SHIPPED | OUTPATIENT
Start: 2023-06-15

## 2023-06-15 RX ORDER — ONDANSETRON 4 MG/1
4 TABLET, FILM COATED ORAL 3 TIMES DAILY PRN
Qty: 15 TABLET | Refills: 0 | Status: SHIPPED | OUTPATIENT
Start: 2023-06-15

## 2023-06-15 RX ORDER — CLONAZEPAM 1 MG/1
1 TABLET ORAL 2 TIMES DAILY PRN
Qty: 60 TABLET | Refills: 0 | Status: SHIPPED | OUTPATIENT
Start: 2023-06-15 | End: 2023-07-15

## 2023-06-21 ASSESSMENT — ENCOUNTER SYMPTOMS
VOMITING: 1
NAUSEA: 1
DIARRHEA: 1

## 2023-07-05 ENCOUNTER — TELEPHONE (OUTPATIENT)
Dept: FAMILY MEDICINE CLINIC | Age: 33
End: 2023-07-05

## 2023-07-05 NOTE — TELEPHONE ENCOUNTER
Patient called and requested TSH labs. Patient reports he has been taking his synthroid in the am and the PM and sometimes a third dose during the day because he has felt so bad. I instructed patient importance of taking medication as directed. Scheduled patient tomorrow for labs and instructed him to go to ED is symptoms worsen. Understanding verbalized.

## 2023-07-06 ENCOUNTER — OFFICE VISIT (OUTPATIENT)
Dept: FAMILY MEDICINE CLINIC | Age: 33
End: 2023-07-06
Payer: COMMERCIAL

## 2023-07-06 VITALS
TEMPERATURE: 97.3 F | BODY MASS INDEX: 21.43 KG/M2 | HEART RATE: 97 BPM | DIASTOLIC BLOOD PRESSURE: 98 MMHG | OXYGEN SATURATION: 99 % | SYSTOLIC BLOOD PRESSURE: 154 MMHG | WEIGHT: 141.38 LBS | HEIGHT: 68 IN

## 2023-07-06 DIAGNOSIS — E86.0 DEHYDRATION: ICD-10-CM

## 2023-07-06 DIAGNOSIS — R79.89 ELEVATED LFTS: ICD-10-CM

## 2023-07-06 DIAGNOSIS — R06.02 SHORTNESS OF BREATH: ICD-10-CM

## 2023-07-06 DIAGNOSIS — F41.9 ANXIETY: Primary | ICD-10-CM

## 2023-07-06 DIAGNOSIS — E06.3 HASHIMOTO'S THYROIDITIS: ICD-10-CM

## 2023-07-06 PROCEDURE — 99214 OFFICE O/P EST MOD 30 MIN: CPT | Performed by: NURSE PRACTITIONER

## 2023-07-06 PROCEDURE — 96360 HYDRATION IV INFUSION INIT: CPT | Performed by: NURSE PRACTITIONER

## 2023-07-06 PROCEDURE — 96361 HYDRATE IV INFUSION ADD-ON: CPT | Performed by: NURSE PRACTITIONER

## 2023-07-06 RX ORDER — 0.9 % SODIUM CHLORIDE 0.9 %
500 INTRAVENOUS SOLUTION INTRAVENOUS ONCE
Status: COMPLETED | OUTPATIENT
Start: 2023-07-06 | End: 2023-07-06

## 2023-07-06 RX ADMIN — Medication 500 ML: at 13:19

## 2023-07-06 NOTE — PROGRESS NOTES
Chief Complaint   Patient presents with    Stress    Hearing Problem    Fatigue    Spasms    Abdominal Pain    Altered Mental Status    Testicle Pain    Blurred Vision     Cannot focus     Shortness of Breath     Patient to the clinic for an ED follow up. Patient was seen yesterday by [facility] ED with the same symptoms he is experiencing in clinic today. He states his symptoms have been ongoing for quite sometime. He states 2 years. He says he has constant body vibrations, he fels as if he may pass out and is \"free falling. \" He also c/o being thirsty all the time, and mental confusion on top of the other symptoms listed above. Have you seen any other physician or provider since your last visit yes - ED [facility].     Have you had any other diagnostic tests since your last visit? yes - EKG    Have you changed or stopped any medications since your last visit? no     I have recommended that this patient have a immunization for varicella but he declines at this time. I have discussed the risks and benefits of this examination with him. The patient verbalizes understanding. Diabetic retinal exam completed this year?  No                       * If yes please have patient sign a records release to obtain record to update Health Maintenance                       * If no, please order referral for patient to be scheduled

## 2023-07-08 DIAGNOSIS — F41.9 ANXIETY: ICD-10-CM

## 2023-07-09 DIAGNOSIS — F41.9 ANXIETY: ICD-10-CM

## 2023-07-10 ENCOUNTER — OFFICE VISIT (OUTPATIENT)
Dept: FAMILY MEDICINE CLINIC | Age: 33
End: 2023-07-10
Payer: COMMERCIAL

## 2023-07-10 ENCOUNTER — HOSPITAL ENCOUNTER (OUTPATIENT)
Facility: HOSPITAL | Age: 33
Discharge: HOME | End: 2023-07-10
Payer: COMMERCIAL

## 2023-07-10 VITALS
BODY MASS INDEX: 21.47 KG/M2 | DIASTOLIC BLOOD PRESSURE: 70 MMHG | TEMPERATURE: 96.8 F | SYSTOLIC BLOOD PRESSURE: 108 MMHG | RESPIRATION RATE: 18 BRPM | WEIGHT: 141.7 LBS | HEIGHT: 68 IN | OXYGEN SATURATION: 97 % | HEART RATE: 80 BPM

## 2023-07-10 DIAGNOSIS — F41.9 ANXIETY: ICD-10-CM

## 2023-07-10 DIAGNOSIS — R79.89 ELEVATED LFTS: ICD-10-CM

## 2023-07-10 DIAGNOSIS — R41.0 DISORIENTATION: Primary | ICD-10-CM

## 2023-07-10 DIAGNOSIS — R41.0 DISORIENTATION: ICD-10-CM

## 2023-07-10 LAB
ALBUMIN SERPL-MCNC: 4.2 G/DL (ref 3.4–4.8)
ALBUMIN/GLOB SERPL: 1.4 {RATIO} (ref 0.8–2)
ALP SERPL-CCNC: 79 U/L (ref 25–100)
ALT SERPL-CCNC: 71 U/L (ref 4–36)
ANION GAP SERPL CALCULATED.3IONS-SCNC: 10 MMOL/L (ref 3–16)
AST SERPL-CCNC: 39 U/L (ref 8–33)
BASOPHILS # BLD: 0.1 K/UL (ref 0–0.1)
BASOPHILS NFR BLD: 0.8 %
BILIRUB SERPL-MCNC: 0.8 MG/DL (ref 0.3–1.2)
BUN SERPL-MCNC: 8 MG/DL (ref 6–20)
CALCIUM SERPL-MCNC: 9.4 MG/DL (ref 8.5–10.5)
CHLORIDE SERPL-SCNC: 101 MMOL/L (ref 98–107)
CO2 SERPL-SCNC: 26 MMOL/L (ref 20–30)
CREAT SERPL-MCNC: 0.7 MG/DL (ref 0.4–1.2)
EOSINOPHIL # BLD: 0.1 K/UL (ref 0–0.4)
EOSINOPHIL NFR BLD: 1.4 %
ERYTHROCYTE [DISTWIDTH] IN BLOOD BY AUTOMATED COUNT: 17.7 % (ref 11–16)
GFR SERPLBLD CREATININE-BSD FMLA CKD-EPI: >60 ML/MIN/{1.73_M2}
GLOBULIN SER CALC-MCNC: 3 G/DL
GLUCOSE SERPL-MCNC: 91 MG/DL (ref 74–106)
HCT VFR BLD AUTO: 42.5 % (ref 40–54)
HGB BLD-MCNC: 13.6 G/DL (ref 13–18)
IMM GRANULOCYTES # BLD: 0 K/UL
IMM GRANULOCYTES NFR BLD: 0.3 % (ref 0–5)
LYMPHOCYTES # BLD: 1.5 K/UL (ref 1.5–4)
LYMPHOCYTES NFR BLD: 23.9 %
MCH RBC QN AUTO: 30.9 PG (ref 27–32)
MCHC RBC AUTO-ENTMCNC: 32 G/DL (ref 31–35)
MCV RBC AUTO: 96.6 FL (ref 80–100)
MONOCYTES # BLD: 0.9 K/UL (ref 0.2–0.8)
MONOCYTES NFR BLD: 13.6 %
NEUTROPHILS # BLD: 3.7 K/UL (ref 2–7.5)
NEUTS SEG NFR BLD: 60 %
PLATELET # BLD AUTO: 173 K/UL (ref 150–400)
PMV BLD AUTO: 9.5 FL (ref 6–10)
POTASSIUM SERPL-SCNC: 4.3 MMOL/L (ref 3.4–5.1)
PROT SERPL-MCNC: 7.2 G/DL (ref 6.4–8.3)
RBC # BLD AUTO: 4.4 M/UL (ref 4.5–6)
SODIUM SERPL-SCNC: 137 MMOL/L (ref 136–145)
WBC # BLD AUTO: 6.2 K/UL (ref 4–11)

## 2023-07-10 PROCEDURE — 85025 COMPLETE CBC W/AUTO DIFF WBC: CPT

## 2023-07-10 PROCEDURE — 99213 OFFICE O/P EST LOW 20 MIN: CPT | Performed by: NURSE PRACTITIONER

## 2023-07-10 PROCEDURE — 80053 COMPREHEN METABOLIC PANEL: CPT

## 2023-07-10 PROCEDURE — 86618 LYME DISEASE ANTIBODY: CPT

## 2023-07-10 PROCEDURE — 86757 RICKETTSIA ANTIBODY: CPT

## 2023-07-10 PROCEDURE — 36415 COLL VENOUS BLD VENIPUNCTURE: CPT

## 2023-07-10 RX ORDER — DICYCLOMINE HYDROCHLORIDE 10 MG/1
CAPSULE ORAL
COMMUNITY
Start: 2023-07-06

## 2023-07-10 RX ORDER — CLONAZEPAM 1 MG/1
1 TABLET ORAL 2 TIMES DAILY PRN
Qty: 60 TABLET | Refills: 0 | Status: SHIPPED | OUTPATIENT
Start: 2023-07-10 | End: 2023-08-09

## 2023-07-10 RX ORDER — CLONAZEPAM 1 MG/1
1 TABLET ORAL 2 TIMES DAILY PRN
Qty: 60 TABLET | Refills: 0 | OUTPATIENT
Start: 2023-07-10 | End: 2023-08-09

## 2023-07-10 RX ORDER — METHYLPREDNISOLONE 4 MG/1
TABLET ORAL
COMMUNITY
Start: 2023-07-06

## 2023-07-10 RX ORDER — FAMOTIDINE 20 MG/1
20 TABLET, FILM COATED ORAL EVERY 12 HOURS
COMMUNITY
Start: 2023-07-06

## 2023-07-10 RX ORDER — PAROXETINE 10 MG/1
10 TABLET, FILM COATED ORAL DAILY
Qty: 30 TABLET | Refills: 0 | OUTPATIENT
Start: 2023-07-10

## 2023-07-10 RX ORDER — CLONAZEPAM 1 MG/1
TABLET ORAL
Qty: 60 TABLET | OUTPATIENT
Start: 2023-07-10

## 2023-07-10 ASSESSMENT — ENCOUNTER SYMPTOMS
NAUSEA: 0
CONSTIPATION: 0
VOMITING: 0
ABDOMINAL PAIN: 0
ABDOMINAL DISTENTION: 0
DIARRHEA: 0

## 2023-07-11 DIAGNOSIS — D50.9 IRON DEFICIENCY ANEMIA, UNSPECIFIED IRON DEFICIENCY ANEMIA TYPE: Primary | ICD-10-CM

## 2023-07-12 DIAGNOSIS — F41.9 ANXIETY: ICD-10-CM

## 2023-07-12 RX ORDER — CLONAZEPAM 1 MG/1
1 TABLET ORAL 2 TIMES DAILY PRN
Qty: 60 TABLET | Refills: 0 | OUTPATIENT
Start: 2023-07-12 | End: 2023-08-11

## 2023-07-14 LAB
R RICKETTSI IGG TITR SER IF: ABNORMAL {TITER}
R RICKETTSI IGM TITR SER IF: ABNORMAL {TITER}

## 2023-07-17 ENCOUNTER — HOSPITAL ENCOUNTER (OUTPATIENT)
Facility: HOSPITAL | Age: 33
Discharge: HOME | End: 2023-07-17
Payer: COMMERCIAL

## 2023-07-17 DIAGNOSIS — R06.02 SHORTNESS OF BREATH: ICD-10-CM

## 2023-07-17 PROCEDURE — 83540 ASSAY OF IRON: CPT

## 2023-07-17 PROCEDURE — 82728 ASSAY OF FERRITIN: CPT

## 2023-07-17 PROCEDURE — 36415 COLL VENOUS BLD VENIPUNCTURE: CPT

## 2023-07-17 PROCEDURE — 83550 IRON BINDING TEST: CPT

## 2023-07-17 PROCEDURE — 93000 ELECTROCARDIOGRAM COMPLETE: CPT | Performed by: NURSE PRACTITIONER

## 2023-07-18 DIAGNOSIS — D50.9 IRON DEFICIENCY ANEMIA, UNSPECIFIED IRON DEFICIENCY ANEMIA TYPE: ICD-10-CM

## 2023-07-18 LAB
FERRITIN SERPL IA-MCNC: 58.9 NG/ML (ref 22–322)
IRON SATN MFR SERPL: 81 % (ref 20–50)
IRON SERPL-MCNC: 245 UG/DL (ref 59–158)
TIBC SERPL-MCNC: 304 UG/DL (ref 250–450)

## 2023-07-20 DIAGNOSIS — D50.9 IRON DEFICIENCY ANEMIA, UNSPECIFIED IRON DEFICIENCY ANEMIA TYPE: ICD-10-CM

## 2023-07-20 DIAGNOSIS — F41.9 ANXIETY: ICD-10-CM

## 2023-07-20 RX ORDER — FERROUS SULFATE 325(65) MG
325 TABLET ORAL EVERY OTHER DAY
Qty: 30 TABLET | Refills: 1 | Status: SHIPPED | OUTPATIENT
Start: 2023-07-20

## 2023-07-20 RX ORDER — CLONAZEPAM 1 MG/1
1 TABLET ORAL 2 TIMES DAILY PRN
Qty: 60 TABLET | Refills: 0 | OUTPATIENT
Start: 2023-07-20 | End: 2023-08-19

## 2023-07-20 NOTE — TELEPHONE ENCOUNTER
Duplicate request medication was sent to [facility] on 7/10/23. Anitha Sanchez and he said he would prepare for patient. Attempted to call patient, no answer, [employer]ap[employer] message sent.

## 2023-08-30 DIAGNOSIS — F41.9 ANXIETY: ICD-10-CM

## 2023-08-31 RX ORDER — CLONAZEPAM 1 MG/1
TABLET ORAL
Qty: 60 TABLET | Refills: 0 | Status: SHIPPED | OUTPATIENT
Start: 2023-08-31 | End: 2023-09-30

## 2023-09-06 ENCOUNTER — PATIENT MESSAGE (OUTPATIENT)
Dept: FAMILY MEDICINE CLINIC | Age: 33
End: 2023-09-06

## 2023-09-06 DIAGNOSIS — F41.9 ANXIETY: ICD-10-CM

## 2023-09-06 RX ORDER — CLONAZEPAM 1 MG/1
1 TABLET ORAL 3 TIMES DAILY PRN
Qty: 90 TABLET | Refills: 0 | Status: SHIPPED | OUTPATIENT
Start: 2023-09-06 | End: 2023-10-06

## 2023-09-06 NOTE — TELEPHONE ENCOUNTER
From: Shay Montalvo  To: Kassidy Escamilla  Sent: 9/6/2023 12:44 PM EDT  Subject: Clonazepam    Markie Mccall. I hope this message finds you well. I'm curious if I can get a stronger dosage of Clonazepam, since i quit drinking and started a new job I find myself running out before my next prescription is ready to be refilled. I was also prescribed Cyclobenzaprine for my right shoulder that helped a lot with my muscle spasums and them lockicking up, but have no prescription refills. Should I contact my orthopedic surgeon for that one?      Thank you,    -Xavier Rodriguez

## 2023-10-09 DIAGNOSIS — F41.9 ANXIETY: ICD-10-CM

## 2023-10-09 RX ORDER — CLONAZEPAM 1 MG/1
TABLET ORAL
Qty: 90 TABLET | OUTPATIENT
Start: 2023-10-09

## 2023-10-09 NOTE — TELEPHONE ENCOUNTER
Requested Prescriptions     Refused Prescriptions Disp Refills    clonazePAM (KLONOPIN) 1 MG tablet [Pharmacy Med Name: CLONAZEPAM 1MG TABLETS] 90 tablet      Sig: TAKE 1 TABLET BY MOUTH THREE TIMES DAILY AS NEEDED FOR ANXIETY.  MAX DAILY AMOUNT: 3 MG     Refused By: Onesimo Burk     Reason for Refusal: Patient needs an appointment

## 2023-11-05 DIAGNOSIS — D50.9 IRON DEFICIENCY ANEMIA, UNSPECIFIED IRON DEFICIENCY ANEMIA TYPE: ICD-10-CM

## 2023-11-05 DIAGNOSIS — F41.9 ANXIETY: ICD-10-CM

## 2023-11-06 RX ORDER — FERROUS SULFATE 325(65) MG
325 TABLET ORAL EVERY OTHER DAY
Qty: 30 TABLET | Refills: 1 | Status: SHIPPED | OUTPATIENT
Start: 2023-11-06

## 2023-11-06 RX ORDER — CLONAZEPAM 1 MG/1
1 TABLET ORAL 3 TIMES DAILY PRN
Qty: 90 TABLET | Refills: 0 | OUTPATIENT
Start: 2023-11-06 | End: 2023-12-06

## 2023-11-27 ENCOUNTER — HOSPITAL ENCOUNTER (OUTPATIENT)
Facility: HOSPITAL | Age: 33
Discharge: HOME | End: 2023-11-27

## 2023-11-27 ENCOUNTER — OFFICE VISIT (OUTPATIENT)
Dept: FAMILY MEDICINE CLINIC | Age: 33
End: 2023-11-27

## 2023-11-27 VITALS
OXYGEN SATURATION: 98 % | SYSTOLIC BLOOD PRESSURE: 114 MMHG | BODY MASS INDEX: 21.9 KG/M2 | TEMPERATURE: 97 F | WEIGHT: 144 LBS | DIASTOLIC BLOOD PRESSURE: 68 MMHG | HEART RATE: 95 BPM

## 2023-11-27 DIAGNOSIS — D50.9 IRON DEFICIENCY ANEMIA, UNSPECIFIED IRON DEFICIENCY ANEMIA TYPE: ICD-10-CM

## 2023-11-27 DIAGNOSIS — E06.3 HASHIMOTO'S THYROIDITIS: Primary | ICD-10-CM

## 2023-11-27 DIAGNOSIS — E06.3 HASHIMOTO'S THYROIDITIS: ICD-10-CM

## 2023-11-27 DIAGNOSIS — F41.9 ANXIETY: ICD-10-CM

## 2023-11-27 LAB
ALBUMIN SERPL-MCNC: 4.7 G/DL (ref 3.4–4.8)
ALBUMIN/GLOB SERPL: 1.7 {RATIO} (ref 0.8–2)
ALP SERPL-CCNC: 86 U/L (ref 25–100)
ALT SERPL-CCNC: 49 U/L (ref 4–36)
ANION GAP SERPL CALCULATED.3IONS-SCNC: 10 MMOL/L (ref 3–16)
AST SERPL-CCNC: 80 U/L (ref 8–33)
BASOPHILS # BLD: 0 K/UL (ref 0–0.1)
BASOPHILS NFR BLD: 0.7 %
BILIRUB SERPL-MCNC: 0.6 MG/DL (ref 0.3–1.2)
BUN SERPL-MCNC: 9 MG/DL (ref 6–20)
CALCIUM SERPL-MCNC: 9.7 MG/DL (ref 8.5–10.5)
CHLORIDE SERPL-SCNC: 97 MMOL/L (ref 98–107)
CO2 SERPL-SCNC: 27 MMOL/L (ref 20–30)
CREAT SERPL-MCNC: 0.8 MG/DL (ref 0.4–1.2)
EOSINOPHIL # BLD: 0.1 K/UL (ref 0–0.4)
EOSINOPHIL NFR BLD: 1.2 %
ERYTHROCYTE [DISTWIDTH] IN BLOOD BY AUTOMATED COUNT: 12.3 % (ref 11–16)
FERRITIN SERPL IA-MCNC: 87 NG/ML (ref 22–322)
GFR SERPLBLD CREATININE-BSD FMLA CKD-EPI: >60 ML/MIN/{1.73_M2}
GLOBULIN SER CALC-MCNC: 2.7 G/DL
GLUCOSE SERPL-MCNC: 138 MG/DL (ref 74–106)
HCT VFR BLD AUTO: 47.5 % (ref 40–54)
HGB BLD-MCNC: 16 G/DL (ref 13–18)
IMM GRANULOCYTES # BLD: 0 K/UL
IMM GRANULOCYTES NFR BLD: 0.4 % (ref 0–5)
IRON SATN MFR SERPL: 82 % (ref 20–50)
IRON SERPL-MCNC: 249 UG/DL (ref 59–158)
LYMPHOCYTES # BLD: 2.3 K/UL (ref 1.5–4)
LYMPHOCYTES NFR BLD: 39.6 %
MCH RBC QN AUTO: 33.4 PG (ref 27–32)
MCHC RBC AUTO-ENTMCNC: 33.7 G/DL (ref 31–35)
MCV RBC AUTO: 99.2 FL (ref 80–100)
MONOCYTES # BLD: 0.6 K/UL (ref 0.2–0.8)
MONOCYTES NFR BLD: 10.6 %
NEUTROPHILS # BLD: 2.7 K/UL (ref 2–7.5)
NEUTS SEG NFR BLD: 47.5 %
PLATELET # BLD AUTO: 258 K/UL (ref 150–400)
PMV BLD AUTO: 9.4 FL (ref 6–10)
POTASSIUM SERPL-SCNC: 4.6 MMOL/L (ref 3.4–5.1)
PROT SERPL-MCNC: 7.4 G/DL (ref 6.4–8.3)
RBC # BLD AUTO: 4.79 M/UL (ref 4.5–6)
SODIUM SERPL-SCNC: 134 MMOL/L (ref 136–145)
TIBC SERPL-MCNC: 304 UG/DL (ref 250–450)
TSH SERPL-MCNC: 2.27 UIU/ML (ref 0.27–4.2)
WBC # BLD AUTO: 5.7 K/UL (ref 4–11)

## 2023-11-27 PROCEDURE — 84443 ASSAY THYROID STIM HORMONE: CPT

## 2023-11-27 PROCEDURE — 83540 ASSAY OF IRON: CPT

## 2023-11-27 PROCEDURE — 80053 COMPREHEN METABOLIC PANEL: CPT

## 2023-11-27 PROCEDURE — 83550 IRON BINDING TEST: CPT

## 2023-11-27 PROCEDURE — 82728 ASSAY OF FERRITIN: CPT

## 2023-11-27 PROCEDURE — 36415 COLL VENOUS BLD VENIPUNCTURE: CPT

## 2023-11-27 PROCEDURE — 85025 COMPLETE CBC W/AUTO DIFF WBC: CPT

## 2023-11-27 PROCEDURE — 99212 OFFICE O/P EST SF 10 MIN: CPT | Performed by: NURSE PRACTITIONER

## 2023-11-27 RX ORDER — CLONAZEPAM 1 MG/1
1 TABLET ORAL 3 TIMES DAILY PRN
Qty: 90 TABLET | Refills: 0 | Status: SHIPPED | OUTPATIENT
Start: 2023-11-27 | End: 2023-12-27

## 2023-11-27 ASSESSMENT — ENCOUNTER SYMPTOMS
DIARRHEA: 0
SHORTNESS OF BREATH: 0
COUGH: 0
ALLERGIC/IMMUNOLOGIC NEGATIVE: 1
VOMITING: 0
NAUSEA: 0
ABDOMINAL PAIN: 0

## 2023-11-27 NOTE — PROGRESS NOTES
Chief Complaint   Patient presents with    Anxiety     Medication refills. Patient here for follow up for medication refills.      Have you seen any other physician or provider since your last visit no    Have you had any other diagnostic tests since your last visit? no    Have you changed or stopped any medications since your last visit? no

## 2023-11-27 NOTE — PROGRESS NOTES
SUBJECTIVE:    Shay Montalvo is a 35 y.o. male    Pt present for refill on Klonopin 1 mg TID PRN for anxiety. Pt reports since starting Klonopin his anxiety has been well managed. 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 Pt reports his anxiety was worse initially due to major financial purchases to start the business. He reports over the past month he has been taking Klonopin 1-2 times per day until he ran out of medication. He also reports his mother was recently diagnosed with stage 4 renal impairment. This has increased stress. Pt has been out of Klonopin for 3 weeks and he feels symptoms are worse at this time. He reports he is not drinking ETOH. Pt reports he had 5 drinks 2 days in a row last week due to thanksgiving. He reports while taking Klonopin he has not been drinking ETOH. Hx of Iron Deficiency anemia. He has been taking Ferrous sulfate 325 mg every other day as directed. Hypothyroidism: Patient presents for evaluation of thyroid function. Symptoms consist of denies fatigue, weight changes, heat/cold intolerance, bowel/skin changes or CVS symptoms. Symptoms have present for 0 none. The symptoms are no. The problem has been stable. Previous thyroid studies include TSH. The hypothyroidism is due to hypothyroidism and Hashimoto's disease. Under the care of endocrinology- Dr Fish Rojas. Pt is current Self Pay and requested TSH. He agrees to follow up with Dr Fish Rojas if needed. Chief Complaint   Patient presents with    Anxiety     Medication refills. Review of Systems   Constitutional: Negative. Negative for fatigue. Respiratory:  Negative for cough and shortness of breath. Cardiovascular:  Negative for chest pain. Gastrointestinal:  Negative for abdominal pain, diarrhea, nausea and vomiting. Endocrine: Negative. Negative for cold intolerance and heat intolerance.

## 2023-12-29 DIAGNOSIS — F41.9 ANXIETY: ICD-10-CM

## 2024-01-02 RX ORDER — CLONAZEPAM 1 MG/1
TABLET ORAL
Qty: 90 TABLET | Refills: 0 | Status: SHIPPED | OUTPATIENT
Start: 2024-01-02 | End: 2024-02-02

## 2024-01-12 ASSESSMENT — PATIENT HEALTH QUESTIONNAIRE - PHQ9
3. TROUBLE FALLING OR STAYING ASLEEP: 1
4. FEELING TIRED OR HAVING LITTLE ENERGY: 1
8. MOVING OR SPEAKING SO SLOWLY THAT OTHER PEOPLE COULD HAVE NOTICED. OR THE OPPOSITE - BEING SO FIDGETY OR RESTLESS THAT YOU HAVE BEEN MOVING AROUND A LOT MORE THAN USUAL: SEVERAL DAYS
SUM OF ALL RESPONSES TO PHQ QUESTIONS 1-9: 8
1. LITTLE INTEREST OR PLEASURE IN DOING THINGS: SEVERAL DAYS
7. TROUBLE CONCENTRATING ON THINGS, SUCH AS READING THE NEWSPAPER OR WATCHING TELEVISION: 2
8. MOVING OR SPEAKING SO SLOWLY THAT OTHER PEOPLE COULD HAVE NOTICED. OR THE OPPOSITE, BEING SO FIGETY OR RESTLESS THAT YOU HAVE BEEN MOVING AROUND A LOT MORE THAN USUAL: 1
SUM OF ALL RESPONSES TO PHQ QUESTIONS 1-9: 8
9. THOUGHTS THAT YOU WOULD BE BETTER OFF DEAD, OR OF HURTING YOURSELF: 0
6. FEELING BAD ABOUT YOURSELF - OR THAT YOU ARE A FAILURE OR HAVE LET YOURSELF OR YOUR FAMILY DOWN: 1
9. THOUGHTS THAT YOU WOULD BE BETTER OFF DEAD, OR OF HURTING YOURSELF: NOT AT ALL
2. FEELING DOWN, DEPRESSED OR HOPELESS: SEVERAL DAYS
4. FEELING TIRED OR HAVING LITTLE ENERGY: SEVERAL DAYS
SUM OF ALL RESPONSES TO PHQ9 QUESTIONS 1 & 2: 2
1. LITTLE INTEREST OR PLEASURE IN DOING THINGS: 1
5. POOR APPETITE OR OVEREATING: 0
7. TROUBLE CONCENTRATING ON THINGS, SUCH AS READING THE NEWSPAPER OR WATCHING TELEVISION: MORE THAN HALF THE DAYS
6. FEELING BAD ABOUT YOURSELF - OR THAT YOU ARE A FAILURE OR HAVE LET YOURSELF OR YOUR FAMILY DOWN: SEVERAL DAYS
SUM OF ALL RESPONSES TO PHQ QUESTIONS 1-9: 8
10. IF YOU CHECKED OFF ANY PROBLEMS, HOW DIFFICULT HAVE THESE PROBLEMS MADE IT FOR YOU TO DO YOUR WORK, TAKE CARE OF THINGS AT HOME, OR GET ALONG WITH OTHER PEOPLE: VERY DIFFICULT
SUM OF ALL RESPONSES TO PHQ QUESTIONS 1-9: 8
2. FEELING DOWN, DEPRESSED OR HOPELESS: 1
5. POOR APPETITE OR OVEREATING: NOT AT ALL
3. TROUBLE FALLING OR STAYING ASLEEP: SEVERAL DAYS
10. IF YOU CHECKED OFF ANY PROBLEMS, HOW DIFFICULT HAVE THESE PROBLEMS MADE IT FOR YOU TO DO YOUR WORK, TAKE CARE OF THINGS AT HOME, OR GET ALONG WITH OTHER PEOPLE: 2
SUM OF ALL RESPONSES TO PHQ QUESTIONS 1-9: 8

## 2024-01-15 ENCOUNTER — OFFICE VISIT (OUTPATIENT)
Dept: FAMILY MEDICINE CLINIC | Age: 34
End: 2024-01-15

## 2024-01-15 VITALS
HEART RATE: 78 BPM | DIASTOLIC BLOOD PRESSURE: 60 MMHG | RESPIRATION RATE: 18 BRPM | TEMPERATURE: 98.2 F | OXYGEN SATURATION: 97 % | SYSTOLIC BLOOD PRESSURE: 102 MMHG | BODY MASS INDEX: 22.73 KG/M2 | WEIGHT: 150 LBS | HEIGHT: 68 IN

## 2024-01-15 DIAGNOSIS — F41.9 ANXIETY: ICD-10-CM

## 2024-01-15 DIAGNOSIS — R68.82 DECREASED LIBIDO: Primary | ICD-10-CM

## 2024-01-15 PROCEDURE — 99213 OFFICE O/P EST LOW 20 MIN: CPT | Performed by: NURSE PRACTITIONER

## 2024-01-15 RX ORDER — CYCLOBENZAPRINE HCL 10 MG
10 TABLET ORAL EVERY 8 HOURS PRN
COMMUNITY
Start: 2023-12-30

## 2024-01-15 RX ORDER — CLONAZEPAM 1 MG/1
TABLET ORAL
Qty: 135 TABLET | Refills: 0 | Status: SHIPPED | OUTPATIENT
Start: 2024-01-15 | End: 2024-02-15

## 2024-01-15 ASSESSMENT — ENCOUNTER SYMPTOMS
COUGH: 0
SHORTNESS OF BREATH: 0
ABDOMINAL PAIN: 0
VOMITING: 0
DIARRHEA: 0
NAUSEA: 0

## 2024-01-15 NOTE — PROGRESS NOTES
SUBJECTIVE:    Elie Wolfe is a 33 y.o. male    Pt present for refill on Klonopin 1 mg TID PRN for anxiety. Pt reports since starting Klonopin his anxiety has been well managed. Pt reports he has had to take more Klonopin due to being illegally evicted from their home. He is current staying in a cabin ([employer]) at the [facility] and he has a new place available to rent in 2-3 weeks.  He has been taking 1.5mg of Klonopin TID. Pt reports when his symptoms are severe Klonopin 2 mg improves symptoms. He feels the increased anxiety is situational. He is currently dealing with a Law Suit regarding eviction. His mom is refusing treatment and needs dialysis. 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 He reports he had 1 drink of ETOH on New Years.  Denies ETOH use.  He does admit he has been smoking Marijuana before bed to help him relax. When he gets into his new home, he is willing to try a new long term option for anxiety. He took Paxil and developed blurred vision so he has been nervous try to try additional pharmacological treatment.     Pt requested to have testosterone checked. He reports a slow steady decrease in libido over the past few years. He has also noticed decreased facial hair growth.                Chief Complaint   Patient presents with    Anxiety    Other     Patient requesting testosterone level         Review of Systems   Constitutional: Negative.  Negative for appetite change and fatigue.   Respiratory:  Negative for cough and shortness of breath.    Cardiovascular:  Negative for chest pain.   Gastrointestinal:  Negative for abdominal pain, diarrhea, nausea and vomiting.   Genitourinary:         Decreased libido   Skin:         Slower rate of growth for facial hair.   Psychiatric/Behavioral:  Positive for sleep disturbance (difficulty sleeping at times- he reports he smokes marijuana occasionally to help him rest at night.). The patient is nervous/anxious.

## 2024-01-15 NOTE — PROGRESS NOTES
Chief Complaint   Patient presents with    Anxiety    Other     Patient requesting testosterone level      Patient here for follow up on anxiety. Patient states he would like to discuss clonazepam with provider.   Patient also requesting to have his testosterone checked.       Have you seen any other physician or provider since your last visit no    Have you had any other diagnostic tests since your last visit? no    Have you changed or stopped any medications since your last visit? no

## 2024-02-21 RX ORDER — LEVOTHYROXINE SODIUM 0.07 MG/1
75 TABLET ORAL DAILY
Qty: 90 TABLET | Refills: 3 | OUTPATIENT
Start: 2024-02-21

## 2024-03-06 RX ORDER — LEVOTHYROXINE SODIUM 0.07 MG/1
75 TABLET ORAL DAILY
Qty: 90 TABLET | Refills: 3 | OUTPATIENT
Start: 2024-03-06

## 2024-03-06 NOTE — TELEPHONE ENCOUNTER
Rx Refill Note  Requested Prescriptions     Pending Prescriptions Disp Refills    levothyroxine (SYNTHROID, LEVOTHROID) 75 MCG tablet [Pharmacy Med Name: LEVOTHYROXINE 0.075MG (75MCG) TABS] 90 tablet 3     Sig: TAKE 1 TABLET BY MOUTH DAILY          Last office visit with prescribing clinician: 7/20/2022     Next office visit with prescribing clinician: Visit date not found         Isadora Hunter MA  03/06/24, 08:52 EST

## 2024-03-07 RX ORDER — LEVOTHYROXINE SODIUM 0.07 MG/1
75 TABLET ORAL DAILY
Qty: 90 TABLET | Refills: 3 | OUTPATIENT
Start: 2024-03-07

## 2024-03-07 RX ORDER — LEVOTHYROXINE SODIUM 0.07 MG/1
75 TABLET ORAL DAILY
Qty: 30 TABLET | Refills: 2 | Status: SHIPPED | OUTPATIENT
Start: 2024-03-07

## 2024-03-07 NOTE — TELEPHONE ENCOUNTER
Received refill request.  Attempted to contact patient.  Patient's phone number has either changed and/or been disconnected.  Could not contact.  Patient needs an appointment before future refills can be submitted.

## 2024-03-07 NOTE — TELEPHONE ENCOUNTER
Patient contacted the office today requesting a refill of Levothyroxine.     He states that he has 5 days worth but is concerned about running out.    Patient verifies he is currently using [facility] in [place], [place].

## 2024-03-17 ENCOUNTER — HOSPITAL ENCOUNTER (EMERGENCY)
Facility: HOSPITAL | Age: 34
Discharge: HOME | End: 2024-03-17
Attending: EMERGENCY MEDICINE

## 2024-03-17 VITALS
TEMPERATURE: 98 F | BODY MASS INDEX: 21.48 KG/M2 | HEART RATE: 113 BPM | HEIGHT: 69 IN | DIASTOLIC BLOOD PRESSURE: 105 MMHG | SYSTOLIC BLOOD PRESSURE: 147 MMHG | OXYGEN SATURATION: 96 % | RESPIRATION RATE: 20 BRPM | WEIGHT: 145 LBS

## 2024-03-17 DIAGNOSIS — R03.0 ELEVATED BLOOD PRESSURE READING: ICD-10-CM

## 2024-03-17 DIAGNOSIS — F41.9 ANXIETY: ICD-10-CM

## 2024-03-17 DIAGNOSIS — F41.9 ANXIETY: Primary | ICD-10-CM

## 2024-03-17 DIAGNOSIS — Z76.0 ENCOUNTER FOR MEDICATION REFILL: ICD-10-CM

## 2024-03-17 PROCEDURE — 99283 EMERGENCY DEPT VISIT LOW MDM: CPT

## 2024-03-17 PROCEDURE — 6370000000 HC RX 637 (ALT 250 FOR IP): Performed by: EMERGENCY MEDICINE

## 2024-03-17 RX ORDER — CLONAZEPAM 0.5 MG/1
1.5 TABLET ORAL ONCE
Status: COMPLETED | OUTPATIENT
Start: 2024-03-17 | End: 2024-03-17

## 2024-03-17 RX ORDER — CLONAZEPAM 1 MG/1
1.5 TABLET ORAL 3 TIMES DAILY
Qty: 9 TABLET | Refills: 0 | Status: SHIPPED | OUTPATIENT
Start: 2024-03-17 | End: 2024-03-19

## 2024-03-17 RX ADMIN — CLONAZEPAM 1.5 MG: 0.5 TABLET ORAL at 16:27

## 2024-03-17 ASSESSMENT — LIFESTYLE VARIABLES
HOW OFTEN DO YOU HAVE A DRINK CONTAINING ALCOHOL: NEVER
HOW MANY STANDARD DRINKS CONTAINING ALCOHOL DO YOU HAVE ON A TYPICAL DAY: PATIENT DOES NOT DRINK

## 2024-03-17 ASSESSMENT — PAIN - FUNCTIONAL ASSESSMENT
PAIN_FUNCTIONAL_ASSESSMENT: NONE - DENIES PAIN
PAIN_FUNCTIONAL_ASSESSMENT: NONE - DENIES PAIN

## 2024-03-17 NOTE — ED NOTES
AVS and Rx reviewed with patient, understanding verbalized. Patient encouraged to follow up with his pcp concerning Klonopin refill. Patient discharged home with no further needs or concerns voiced.

## 2024-03-17 NOTE — ED TRIAGE NOTES
Patient to ED with Klonopin withdrawal, he states he hasn't had it for 3 days because his pcp did not approve it. Patient with increased anxiety / nervous and shaking.

## 2024-03-17 NOTE — ED PROVIDER NOTES
TO:  Beryl Rendon, APRN - NP  [address] [facility] [address]  [phone]    Go in 1 day        DISCHARGE MEDICATIONS:  Discharge Medication List as of 3/17/2024  4:21 PM          DISCONTINUED MEDICATIONS:  Discharge Medication List as of 3/17/2024  4:21 PM                 (Please note that portions of this note were completed with a voice recognition program.  Efforts were made to edit the dictations but occasionally words are mis-transcribed.)    Oliver Martin DO (electronically signed)          Oliver Martin DO  03/18/24 1818

## 2024-03-18 DIAGNOSIS — F41.9 ANXIETY: ICD-10-CM

## 2024-03-18 PROBLEM — R03.0 ELEVATED BLOOD PRESSURE READING: Status: ACTIVE | Noted: 2024-03-18

## 2024-03-18 PROBLEM — Z76.0 ENCOUNTER FOR MEDICATION REFILL: Status: ACTIVE | Noted: 2024-03-18

## 2024-03-18 RX ORDER — CLONAZEPAM 1 MG/1
TABLET ORAL
Qty: 135 TABLET | OUTPATIENT
Start: 2024-03-18

## 2024-03-18 RX ORDER — CLONAZEPAM 1 MG/1
TABLET ORAL
Qty: 32 TABLET | Refills: 0 | Status: SHIPPED | OUTPATIENT
Start: 2024-03-18 | End: 2024-03-19

## 2024-03-19 ENCOUNTER — HOSPITAL ENCOUNTER (OUTPATIENT)
Facility: HOSPITAL | Age: 34
Discharge: HOME | End: 2024-03-19

## 2024-03-19 ENCOUNTER — OFFICE VISIT (OUTPATIENT)
Dept: FAMILY MEDICINE CLINIC | Age: 34
End: 2024-03-19

## 2024-03-19 VITALS
BODY MASS INDEX: 21.72 KG/M2 | OXYGEN SATURATION: 97 % | WEIGHT: 148.3 LBS | SYSTOLIC BLOOD PRESSURE: 106 MMHG | DIASTOLIC BLOOD PRESSURE: 66 MMHG | TEMPERATURE: 97.9 F | HEART RATE: 83 BPM

## 2024-03-19 DIAGNOSIS — F41.9 ANXIETY: ICD-10-CM

## 2024-03-19 PROCEDURE — 84270 ASSAY OF SEX HORMONE GLOBUL: CPT

## 2024-03-19 PROCEDURE — 84403 ASSAY OF TOTAL TESTOSTERONE: CPT

## 2024-03-19 PROCEDURE — 99213 OFFICE O/P EST LOW 20 MIN: CPT | Performed by: NURSE PRACTITIONER

## 2024-03-19 RX ORDER — CLONAZEPAM 1 MG/1
TABLET ORAL
Qty: 135 TABLET | Refills: 0 | Status: SHIPPED | OUTPATIENT
Start: 2024-03-19 | End: 2024-04-19

## 2024-03-19 RX ORDER — VENLAFAXINE HYDROCHLORIDE 37.5 MG/1
37.5 CAPSULE, EXTENDED RELEASE ORAL DAILY
Qty: 7 CAPSULE | Refills: 0 | Status: SHIPPED | OUTPATIENT
Start: 2024-03-19 | End: 2024-03-27 | Stop reason: SDUPTHER

## 2024-03-19 NOTE — PROGRESS NOTES
Chief Complaint   Patient presents with    Anxiety     Patient here for follow up to discuss tapering off of clonazepam. Patient reports he lost his clonazepam during a move last week. He states he went Thursday-Sunday without medication and ended up at the ED because he was experiencing withdraws. ER physician provided him with enough medication to last until his apt today.     Have you seen any other physician or provider since your last visit yes - ED visit     Have you had any other diagnostic tests since your last visit? no    Have you changed or stopped any medications since your last visit? no

## 2024-03-19 NOTE — PROGRESS NOTES
SUBJECTIVE:    Elie Wolfe is a 33 y.o. male    Patient here for follow up to discuss tapering off of clonazepam. Patient reports he lost his clonazepam during a move last week. He states he went Thursday-Sunday without medication and ended up at [facility] ER on 03/17/2024 because he was experiencing withdrawal symptoms. He reports he was having difficulty focusing, tremors, SOB and nervousness  He reports within 35 minutes to 1 hour after taking Klonopin symptoms had resolved and he felt well. He went home and was able to clean house. He reports today he feels great. ER physician provided him with enough medication to last until his apt today. He feels his anxiety is well managed with the Klonopin however he would like to discuss ways to \"get off Klonopin\"         Chief Complaint   Patient presents with    Anxiety        Review of Systems   Constitutional: Negative.    Respiratory:  Negative for cough and shortness of breath.    Cardiovascular:  Negative for chest pain.   Gastrointestinal:  Negative for abdominal pain, diarrhea, nausea and vomiting.   Psychiatric/Behavioral:  Negative for dysphoric mood, sleep disturbance and suicidal ideas. The patient is nervous/anxious.         OBJECTIVE:    /66   Pulse 83   Temp 97.9 °F (36.6 °C) (Infrared)   Wt 67.3 kg (148 lb 4.8 oz)   SpO2 97% Comment: RA  BMI 21.72 kg/m²    Physical Exam  Vitals and nursing note reviewed.   Constitutional:       Appearance: Normal appearance. He is well-developed.   HENT:      Head: Normocephalic.   Eyes:      Extraocular Movements: Extraocular movements intact.      Conjunctiva/sclera: Conjunctivae normal.      Pupils: Pupils are equal, round, and reactive to light.   Neck:      Thyroid: No thyromegaly.      Vascular: No carotid bruit.   Cardiovascular:      Rate and Rhythm: Normal rate and regular rhythm.      Heart sounds: Normal heart sounds. No murmur heard.  Pulmonary:      Effort: Pulmonary effort is normal.      Breath

## 2024-03-20 DIAGNOSIS — R68.82 DECREASED LIBIDO: ICD-10-CM

## 2024-03-22 LAB
SHBG SERPL-SCNC: 37 NMOL/L (ref 17–56)
TESTOST FREE SERPL-MCNC: 128.5 PG/ML (ref 47–244)
TESTOST SERPL-MCNC: 630 NG/DL (ref 249–836)

## 2024-03-26 ASSESSMENT — ENCOUNTER SYMPTOMS
COUGH: 0
ABDOMINAL PAIN: 0
NAUSEA: 0
SHORTNESS OF BREATH: 0
VOMITING: 0
DIARRHEA: 0

## 2024-03-27 DIAGNOSIS — F41.9 ANXIETY: ICD-10-CM

## 2024-03-27 RX ORDER — CLONAZEPAM 1 MG/1
TABLET ORAL
Qty: 135 TABLET | Refills: 0 | OUTPATIENT
Start: 2024-03-27 | End: 2024-04-27

## 2024-03-27 RX ORDER — VENLAFAXINE HYDROCHLORIDE 37.5 MG/1
37.5 CAPSULE, EXTENDED RELEASE ORAL DAILY
Qty: 30 CAPSULE | Refills: 3 | Status: SHIPPED | OUTPATIENT
Start: 2024-03-27

## 2024-04-26 DIAGNOSIS — F41.9 ANXIETY: ICD-10-CM

## 2024-04-29 RX ORDER — CLONAZEPAM 1 MG/1
TABLET ORAL
Qty: 135 TABLET | Refills: 0 | Status: SHIPPED | OUTPATIENT
Start: 2024-04-29 | End: 2024-04-29

## 2024-05-06 ENCOUNTER — TELEPHONE (OUTPATIENT)
Dept: FAMILY MEDICINE CLINIC | Age: 34
End: 2024-05-06

## 2024-05-06 DIAGNOSIS — F41.9 ANXIETY: ICD-10-CM

## 2024-05-06 RX ORDER — CLONAZEPAM 2 MG/1
TABLET ORAL
Qty: 60 TABLET | Refills: 0 | Status: SHIPPED | OUTPATIENT
Start: 2024-05-06 | End: 2024-05-06

## 2024-05-06 NOTE — TELEPHONE ENCOUNTER
Patient called and stated that [facility] is currently out of the Klonopin 1 mg they only have 2 mg is there is any way you could write his script to fit what they currently have available.

## 2024-05-16 DIAGNOSIS — D50.9 IRON DEFICIENCY ANEMIA, UNSPECIFIED IRON DEFICIENCY ANEMIA TYPE: ICD-10-CM

## 2024-05-16 RX ORDER — FERROUS SULFATE 325(65) MG
1 TABLET ORAL EVERY OTHER DAY
Qty: 30 TABLET | Refills: 1 | OUTPATIENT
Start: 2024-05-16

## 2024-05-16 RX ORDER — FERROUS SULFATE 325(65) MG
1 TABLET ORAL EVERY OTHER DAY
Qty: 30 TABLET | Refills: 1 | Status: SHIPPED | OUTPATIENT
Start: 2024-05-16

## 2024-05-31 DIAGNOSIS — F41.9 ANXIETY: ICD-10-CM

## 2024-06-03 RX ORDER — CLONAZEPAM 2 MG/1
TABLET ORAL
Qty: 60 TABLET | Refills: 0 | OUTPATIENT
Start: 2024-06-03

## 2024-06-05 RX ORDER — CLONAZEPAM 2 MG/1
TABLET ORAL
Qty: 60 TABLET | Refills: 0 | OUTPATIENT
Start: 2024-06-05

## 2024-06-07 DIAGNOSIS — F41.9 ANXIETY: ICD-10-CM

## 2024-06-10 RX ORDER — VENLAFAXINE HYDROCHLORIDE 37.5 MG/1
37.5 CAPSULE, EXTENDED RELEASE ORAL DAILY
Qty: 30 CAPSULE | Refills: 3 | Status: SHIPPED | OUTPATIENT
Start: 2024-06-10

## 2024-06-10 RX ORDER — CLONAZEPAM 2 MG/1
TABLET ORAL
Qty: 60 TABLET | Refills: 0 | OUTPATIENT
Start: 2024-06-10

## 2024-06-10 RX ORDER — LEVOTHYROXINE SODIUM 0.07 MG/1
75 TABLET ORAL DAILY
Qty: 90 TABLET | Refills: 1 | Status: SHIPPED | OUTPATIENT
Start: 2024-06-10

## 2024-06-10 RX ORDER — LEVOTHYROXINE SODIUM 0.07 MG/1
75 TABLET ORAL DAILY
Qty: 90 TABLET | Refills: 1 | OUTPATIENT
Start: 2024-06-10

## 2024-06-10 RX ORDER — LEVOTHYROXINE SODIUM 0.07 MG/1
75 TABLET ORAL DAILY
Qty: 30 TABLET | Refills: 2 | OUTPATIENT
Start: 2024-06-10

## 2024-06-10 NOTE — TELEPHONE ENCOUNTER
Patient called the office today requesting a refill of the pended medication. He has an appointment scheduled for Thursday but states he is out of medication at this time.

## 2024-06-11 RX ORDER — LEVOTHYROXINE SODIUM 0.07 MG/1
75 TABLET ORAL DAILY
Qty: 90 TABLET | Refills: 1 | OUTPATIENT
Start: 2024-06-11

## 2024-06-11 NOTE — TELEPHONE ENCOUNTER
Spoke with [facility] to verify that they received prescription yesterday and Nina verified that they did. They state medication is on hold because insurance has been terminated. She states patient is aware and has been paying out of pocket.

## 2024-09-19 ENCOUNTER — HOSPITAL ENCOUNTER (OUTPATIENT)
Facility: HOSPITAL | Age: 34
Discharge: HOME | End: 2024-09-19

## 2024-09-19 ENCOUNTER — OFFICE VISIT (OUTPATIENT)
Dept: FAMILY MEDICINE CLINIC | Age: 34
End: 2024-09-19

## 2024-09-19 VITALS
TEMPERATURE: 98.2 F | HEART RATE: 73 BPM | OXYGEN SATURATION: 98 % | BODY MASS INDEX: 21.04 KG/M2 | SYSTOLIC BLOOD PRESSURE: 120 MMHG | WEIGHT: 143.7 LBS | RESPIRATION RATE: 18 BRPM | DIASTOLIC BLOOD PRESSURE: 70 MMHG

## 2024-09-19 DIAGNOSIS — E06.3 HASHIMOTO'S THYROIDITIS: Primary | ICD-10-CM

## 2024-09-19 PROCEDURE — 84443 ASSAY THYROID STIM HORMONE: CPT

## 2024-09-19 PROCEDURE — 99213 OFFICE O/P EST LOW 20 MIN: CPT | Performed by: NURSE PRACTITIONER

## 2024-09-19 PROCEDURE — 36415 COLL VENOUS BLD VENIPUNCTURE: CPT

## 2024-09-19 RX ORDER — LEVOTHYROXINE SODIUM 75 UG/1
75 TABLET ORAL DAILY
Qty: 90 TABLET | Refills: 1 | Status: SHIPPED | OUTPATIENT
Start: 2024-09-19

## 2024-09-19 ASSESSMENT — ENCOUNTER SYMPTOMS
SHORTNESS OF BREATH: 0
VOMITING: 0
DIARRHEA: 0
ABDOMINAL PAIN: 0
NAUSEA: 0
COUGH: 0

## 2024-09-20 DIAGNOSIS — E06.3 HASHIMOTO'S THYROIDITIS: ICD-10-CM

## 2024-09-20 LAB — TSH SERPL-MCNC: 2.39 UIU/ML (ref 0.27–4.2)

## 2025-03-22 DIAGNOSIS — E06.3 HASHIMOTO'S THYROIDITIS: ICD-10-CM

## 2025-03-24 DIAGNOSIS — E06.3 HASHIMOTO'S THYROIDITIS: ICD-10-CM

## 2025-03-24 RX ORDER — LEVOTHYROXINE SODIUM 75 UG/1
75 TABLET ORAL DAILY
Qty: 30 TABLET | Refills: 0 | Status: SHIPPED | OUTPATIENT
Start: 2025-03-24

## 2025-03-24 RX ORDER — LEVOTHYROXINE SODIUM 75 UG/1
75 TABLET ORAL DAILY
Qty: 90 TABLET | OUTPATIENT
Start: 2025-03-24

## 2025-03-24 RX ORDER — LEVOTHYROXINE SODIUM 75 UG/1
75 TABLET ORAL DAILY
Qty: 30 TABLET | Refills: 0 | Status: SHIPPED | OUTPATIENT
Start: 2025-03-24 | End: 2025-03-24

## 2025-03-24 NOTE — TELEPHONE ENCOUNTER
Requested Prescriptions     Signed Prescriptions Disp Refills    levothyroxine (SYNTHROID) 75 MCG tablet 30 tablet 0     Sig: TAKE 1 TABLET BY MOUTH DAILY     Authorizing Provider: JES SHIPLEY     Ordering User: EFREN HERNANDEZ

## 2025-06-10 DIAGNOSIS — E06.3 HASHIMOTO'S THYROIDITIS: ICD-10-CM

## 2025-06-10 DIAGNOSIS — D50.9 IRON DEFICIENCY ANEMIA, UNSPECIFIED IRON DEFICIENCY ANEMIA TYPE: ICD-10-CM

## 2025-06-10 RX ORDER — LEVOTHYROXINE SODIUM 75 UG/1
75 TABLET ORAL DAILY
Qty: 30 TABLET | Refills: 0 | OUTPATIENT
Start: 2025-06-10

## 2025-06-10 RX ORDER — FERROUS SULFATE 325(65) MG
1 TABLET ORAL EVERY OTHER DAY
Qty: 30 TABLET | Refills: 1 | OUTPATIENT
Start: 2025-06-10

## 2025-06-12 DIAGNOSIS — E06.3 HASHIMOTO'S THYROIDITIS: ICD-10-CM

## 2025-06-12 RX ORDER — LEVOTHYROXINE SODIUM 75 UG/1
75 TABLET ORAL DAILY
Qty: 14 TABLET | Refills: 0 | Status: SHIPPED | OUTPATIENT
Start: 2025-06-12

## 2025-07-05 DIAGNOSIS — E06.3 HASHIMOTO'S THYROIDITIS: ICD-10-CM

## 2025-07-07 RX ORDER — LEVOTHYROXINE SODIUM 75 UG/1
75 TABLET ORAL DAILY
Qty: 7 TABLET | Refills: 0 | Status: SHIPPED | OUTPATIENT
Start: 2025-07-07

## 2025-07-07 NOTE — TELEPHONE ENCOUNTER
Requested Prescriptions     Signed Prescriptions Disp Refills    levothyroxine (SYNTHROID) 75 MCG tablet 7 tablet 0     Sig: Take 1 tablet by mouth Daily     Authorizing Provider: JES SHIPLEY     Ordering User: EFREN HERNANDEZ     Week supply sent. Patient has not been seen in office since Sept 2024. Patient has been informed that he will need apt for medication refills.

## 2025-09-04 ENCOUNTER — HOSPITAL ENCOUNTER (OUTPATIENT)
Facility: HOSPITAL | Age: 35
Discharge: HOME | End: 2025-09-04

## 2025-09-04 ENCOUNTER — OFFICE VISIT (OUTPATIENT)
Age: 35
End: 2025-09-04

## 2025-09-04 VITALS
WEIGHT: 140.6 LBS | RESPIRATION RATE: 18 BRPM | SYSTOLIC BLOOD PRESSURE: 128 MMHG | TEMPERATURE: 98.8 F | BODY MASS INDEX: 20.83 KG/M2 | OXYGEN SATURATION: 95 % | DIASTOLIC BLOOD PRESSURE: 68 MMHG | HEIGHT: 69 IN | HEART RATE: 93 BPM

## 2025-09-04 DIAGNOSIS — Z13.220 SCREENING FOR HYPERLIPIDEMIA: ICD-10-CM

## 2025-09-04 DIAGNOSIS — E61.1 IRON DEFICIENCY: ICD-10-CM

## 2025-09-04 DIAGNOSIS — R74.8 ELEVATED LIVER ENZYMES: ICD-10-CM

## 2025-09-04 DIAGNOSIS — E06.3 HASHIMOTO'S THYROIDITIS: ICD-10-CM

## 2025-09-04 DIAGNOSIS — E06.3 HASHIMOTO'S THYROIDITIS: Primary | ICD-10-CM

## 2025-09-04 LAB
ALBUMIN SERPL-MCNC: 4.3 G/DL (ref 3.4–4.8)
ALBUMIN/GLOB SERPL: 1.3 {RATIO} (ref 0.8–2)
ALP SERPL-CCNC: 80 U/L (ref 25–100)
ALT SERPL-CCNC: 22 U/L (ref 4–36)
ANION GAP SERPL CALCULATED.3IONS-SCNC: 14 MMOL/L (ref 3–16)
AST SERPL-CCNC: 33 U/L (ref 8–33)
BASOPHILS # BLD: 0 K/UL (ref 0–0.1)
BASOPHILS NFR BLD: 0.6 %
BILIRUB SERPL-MCNC: 0.3 MG/DL (ref 0.3–1.2)
BUN SERPL-MCNC: 11 MG/DL (ref 6–20)
CALCIUM SERPL-MCNC: 9.4 MG/DL (ref 8.3–10.6)
CHLORIDE SERPL-SCNC: 102 MMOL/L (ref 98–107)
CHOLEST SERPL-MCNC: 267 MG/DL (ref 0–200)
CO2 SERPL-SCNC: 23 MMOL/L (ref 20–30)
CREAT SERPL-MCNC: 0.8 MG/DL (ref 0.9–1.3)
EOSINOPHIL # BLD: 0.1 K/UL (ref 0–0.4)
EOSINOPHIL NFR BLD: 1.1 %
ERYTHROCYTE [DISTWIDTH] IN BLOOD BY AUTOMATED COUNT: 12.9 % (ref 11–16)
FOLATE SERPL-MCNC: 4.97 NG/ML
GFR SERPLBLD CREATININE-BSD FMLA CKD-EPI: >90 ML/MIN/{1.73_M2}
GLOBULIN SER CALC-MCNC: 3.3 G/DL
GLUCOSE SERPL-MCNC: 115 MG/DL (ref 74–106)
HCT VFR BLD AUTO: 43.9 % (ref 40–54)
HDLC SERPL-MCNC: 53 MG/DL (ref 40–60)
HGB BLD-MCNC: 14.9 G/DL (ref 13–18)
IMM GRANULOCYTES # BLD: 0 K/UL
IMM GRANULOCYTES NFR BLD: 0.2 % (ref 0–5)
IRON SATN MFR SERPL: 30 % (ref 20–50)
IRON SERPL-MCNC: 90 UG/DL (ref 59–158)
LDLC SERPL CALC-MCNC: ABNORMAL MG/DL
LDLC SERPL-MCNC: 100 MG/DL
LYMPHOCYTES # BLD: 2.4 K/UL (ref 1.5–4)
LYMPHOCYTES NFR BLD: 38.5 %
MCH RBC QN AUTO: 33.9 PG (ref 27–32)
MCHC RBC AUTO-ENTMCNC: 33.9 G/DL (ref 31–35)
MCV RBC AUTO: 99.8 FL (ref 80–100)
MONOCYTES # BLD: 0.6 K/UL (ref 0.2–0.8)
MONOCYTES NFR BLD: 8.7 %
NEUTROPHILS # BLD: 3.2 K/UL (ref 2–7.5)
NEUTS SEG NFR BLD: 50.9 %
PLATELET # BLD AUTO: 281 K/UL (ref 150–400)
PMV BLD AUTO: 9.5 FL (ref 6–10)
POTASSIUM SERPL-SCNC: 4.1 MMOL/L (ref 3.4–5.1)
PROT SERPL-MCNC: 7.6 G/DL (ref 6.4–8.3)
RBC # BLD AUTO: 4.4 M/UL (ref 4.5–6)
SODIUM SERPL-SCNC: 139 MMOL/L (ref 136–145)
TIBC SERPL-MCNC: 300 UG/DL (ref 250–450)
TRIGL SERPL-MCNC: 879 MG/DL (ref 0–249)
TSH SERPL DL<=0.005 MIU/L-ACNC: 1.75 UIU/ML (ref 0.27–4.2)
VIT B12 SERPL-MCNC: 289 PG/ML (ref 211–911)
VLDLC SERPL CALC-MCNC: ABNORMAL MG/DL
WBC # BLD AUTO: 6.3 K/UL (ref 4–11)

## 2025-09-04 PROCEDURE — 85025 COMPLETE CBC W/AUTO DIFF WBC: CPT

## 2025-09-04 PROCEDURE — 84443 ASSAY THYROID STIM HORMONE: CPT

## 2025-09-04 PROCEDURE — 82746 ASSAY OF FOLIC ACID SERUM: CPT

## 2025-09-04 PROCEDURE — 83540 ASSAY OF IRON: CPT

## 2025-09-04 PROCEDURE — 80053 COMPREHEN METABOLIC PANEL: CPT

## 2025-09-04 PROCEDURE — 99214 OFFICE O/P EST MOD 30 MIN: CPT

## 2025-09-04 PROCEDURE — 80061 LIPID PANEL: CPT

## 2025-09-04 PROCEDURE — 36415 COLL VENOUS BLD VENIPUNCTURE: CPT

## 2025-09-04 PROCEDURE — 83550 IRON BINDING TEST: CPT

## 2025-09-04 PROCEDURE — 81256 HFE GENE: CPT

## 2025-09-04 PROCEDURE — 82607 VITAMIN B-12: CPT

## 2025-09-04 RX ORDER — LEVOTHYROXINE SODIUM 75 UG/1
75 TABLET ORAL DAILY
Qty: 90 TABLET | Refills: 0 | Status: SHIPPED | OUTPATIENT
Start: 2025-09-04

## 2025-09-05 ASSESSMENT — ENCOUNTER SYMPTOMS
GASTROINTESTINAL NEGATIVE: 1
ALLERGIC/IMMUNOLOGIC NEGATIVE: 1
EYES NEGATIVE: 1
RESPIRATORY NEGATIVE: 1